# Patient Record
Sex: FEMALE | Race: WHITE | Employment: UNEMPLOYED | URBAN - METROPOLITAN AREA
[De-identification: names, ages, dates, MRNs, and addresses within clinical notes are randomized per-mention and may not be internally consistent; named-entity substitution may affect disease eponyms.]

---

## 2020-05-23 PROBLEM — R79.1 ELEVATED INR: Status: ACTIVE | Noted: 2020-05-23

## 2020-05-23 PROBLEM — K72.00 ACUTE LIVER FAILURE WITHOUT HEPATIC COMA: Status: ACTIVE | Noted: 2020-05-23

## 2020-05-23 PROBLEM — E83.42 HYPOMAGNESEMIA: Status: ACTIVE | Noted: 2020-05-23

## 2020-05-23 PROBLEM — F10.21 ALCOHOL INTOXICATION IN RELAPSED ALCOHOLIC (HCC): Status: ACTIVE | Noted: 2020-05-23

## 2020-05-23 PROBLEM — R74.01 TRANSAMINITIS: Status: ACTIVE | Noted: 2020-05-23

## 2020-05-23 PROBLEM — R79.89 LOW BUN: Status: ACTIVE | Noted: 2020-05-23

## 2020-05-23 PROBLEM — E87.6 HYPOKALEMIA: Status: ACTIVE | Noted: 2020-05-23

## 2020-05-23 PROBLEM — D61.818 PANCYTOPENIA (HCC): Status: ACTIVE | Noted: 2020-05-23

## 2020-05-23 PROBLEM — A41.9 SEPTICEMIA (HCC): Status: ACTIVE | Noted: 2020-05-23

## 2020-05-23 PROBLEM — E87.20 LACTIC ACIDOSIS: Status: ACTIVE | Noted: 2020-05-23

## 2020-05-23 PROBLEM — R17 ELEVATED BILIRUBIN: Status: ACTIVE | Noted: 2020-05-23

## 2020-05-23 PROBLEM — K70.31 ASCITES DUE TO ALCOHOLIC CIRRHOSIS (HCC): Status: ACTIVE | Noted: 2020-05-23

## 2020-05-23 PROBLEM — N39.0 UTI (URINARY TRACT INFECTION): Status: ACTIVE | Noted: 2020-05-23

## 2020-06-22 PROBLEM — N39.0 UTI (URINARY TRACT INFECTION): Status: RESOLVED | Noted: 2020-05-23 | Resolved: 2020-06-22

## 2020-06-22 PROBLEM — R56.9 WITNESSED SEIZURE (HCC): Status: ACTIVE | Noted: 2020-06-22

## 2020-07-07 PROBLEM — L89.312 PRESSURE ULCER OF RIGHT BUTTOCK, STAGE 2 (HCC): Status: ACTIVE | Noted: 2020-07-07

## 2020-07-09 PROBLEM — R50.9 FEVER IN ADULT: Status: RESOLVED | Noted: 2020-07-09 | Resolved: 2020-07-09

## 2020-07-09 PROBLEM — G93.41 ACUTE METABOLIC ENCEPHALOPATHY: Status: ACTIVE | Noted: 2020-07-09

## 2020-07-09 PROBLEM — R50.9 FEVER IN ADULT: Status: ACTIVE | Noted: 2020-07-09

## 2020-07-09 PROBLEM — E87.6 HYPOKALEMIA: Status: RESOLVED | Noted: 2020-05-23 | Resolved: 2020-07-09

## 2020-07-09 PROBLEM — D53.9 MACROCYTIC ANEMIA: Status: ACTIVE | Noted: 2020-07-09

## 2020-07-09 PROBLEM — R13.19 OTHER DYSPHAGIA: Status: ACTIVE | Noted: 2020-07-09

## 2020-07-09 PROBLEM — D69.6 THROMBOCYTOPENIA (HCC): Status: ACTIVE | Noted: 2020-07-09

## 2020-07-09 PROBLEM — R33.9 URINARY RETENTION: Status: ACTIVE | Noted: 2020-07-09

## 2020-07-09 PROBLEM — R33.9 URINARY RETENTION: Status: RESOLVED | Noted: 2020-07-09 | Resolved: 2020-07-09

## 2020-07-22 PROBLEM — R13.10 DYSPHAGIA: Status: ACTIVE | Noted: 2020-07-09

## 2020-07-27 PROBLEM — F29 PSYCHOSIS (HCC): Status: ACTIVE | Noted: 2020-07-27

## 2020-07-31 PROBLEM — F32.A DEPRESSIVE DISORDER: Status: ACTIVE | Noted: 2020-07-31

## 2020-08-03 PROBLEM — E44.0 MODERATE MALNUTRITION (HCC): Status: ACTIVE | Noted: 2020-08-03

## 2020-08-09 PROBLEM — R41.0 CONFUSION: Status: ACTIVE | Noted: 2020-08-09

## 2020-08-29 ENCOUNTER — APPOINTMENT (OUTPATIENT)
Dept: GENERAL RADIOLOGY | Age: 60
End: 2020-08-29
Payer: COMMERCIAL

## 2020-08-29 ENCOUNTER — HOSPITAL ENCOUNTER (EMERGENCY)
Age: 60
Discharge: HOME | End: 2020-08-30
Attending: EMERGENCY MEDICINE
Payer: COMMERCIAL

## 2020-08-29 LAB
ALBUMIN SERPL-MCNC: 3.1 G/DL (ref 3.5–5.2)
ALP BLD-CCNC: 106 U/L (ref 35–104)
ALT SERPL-CCNC: 15 U/L (ref 0–32)
ANION GAP SERPL CALCULATED.3IONS-SCNC: 9 MMOL/L (ref 7–16)
ANISOCYTOSIS: ABNORMAL
AST SERPL-CCNC: 35 U/L (ref 0–31)
BACTERIA: ABNORMAL /HPF
BASOPHILS ABSOLUTE: 0.02 E9/L (ref 0–0.2)
BASOPHILS RELATIVE PERCENT: 0.5 % (ref 0–2)
BILIRUB SERPL-MCNC: 0.7 MG/DL (ref 0–1.2)
BILIRUBIN URINE: NEGATIVE
BLOOD, URINE: ABNORMAL
BUN BLDV-MCNC: 11 MG/DL (ref 6–20)
CALCIUM SERPL-MCNC: 9.4 MG/DL (ref 8.6–10.2)
CHLORIDE BLD-SCNC: 102 MMOL/L (ref 98–107)
CHP ED QC CHECK: NORMAL
CHP ED QC CHECK: YES
CLARITY: CLEAR
CO2: 28 MMOL/L (ref 22–29)
COLOR: YELLOW
CREAT SERPL-MCNC: 0.7 MG/DL (ref 0.5–1)
EOSINOPHILS ABSOLUTE: 0.07 E9/L (ref 0.05–0.5)
EOSINOPHILS RELATIVE PERCENT: 1.9 % (ref 0–6)
GFR AFRICAN AMERICAN: >60
GFR NON-AFRICAN AMERICAN: >60 ML/MIN/1.73
GLUCOSE BLD-MCNC: 118 MG/DL
GLUCOSE BLD-MCNC: 119 MG/DL (ref 74–99)
GLUCOSE URINE: NEGATIVE MG/DL
HCT VFR BLD CALC: 28.8 % (ref 34–48)
HEMOCCULT STL QL: NEGATIVE
HEMOGLOBIN: 8.8 G/DL (ref 11.5–15.5)
IMMATURE GRANULOCYTES #: 0.01 E9/L
IMMATURE GRANULOCYTES %: 0.3 % (ref 0–5)
KETONES, URINE: NEGATIVE MG/DL
LACTIC ACID: 1.8 MMOL/L (ref 0.5–2.2)
LEUKOCYTE ESTERASE, URINE: ABNORMAL
LYMPHOCYTES ABSOLUTE: 1.07 E9/L (ref 1.5–4)
LYMPHOCYTES RELATIVE PERCENT: 28.7 % (ref 20–42)
MCH RBC QN AUTO: 28.9 PG (ref 26–35)
MCHC RBC AUTO-ENTMCNC: 30.6 % (ref 32–34.5)
MCV RBC AUTO: 94.4 FL (ref 80–99.9)
METER GLUCOSE: 118 MG/DL (ref 74–99)
MONOCYTES ABSOLUTE: 0.55 E9/L (ref 0.1–0.95)
MONOCYTES RELATIVE PERCENT: 14.7 % (ref 2–12)
NEUTROPHILS ABSOLUTE: 2.01 E9/L (ref 1.8–7.3)
NEUTROPHILS RELATIVE PERCENT: 53.9 % (ref 43–80)
NITRITE, URINE: NEGATIVE
PDW BLD-RTO: 14.5 FL (ref 11.5–15)
PH UA: 7 (ref 5–9)
PLATELET # BLD: 70 E9/L (ref 130–450)
PLATELET CONFIRMATION: NORMAL
PMV BLD AUTO: 11.2 FL (ref 7–12)
POLYCHROMASIA: ABNORMAL
POTASSIUM REFLEX MAGNESIUM: 3.6 MMOL/L (ref 3.5–5)
PROTEIN UA: NEGATIVE MG/DL
RBC # BLD: 3.05 E12/L (ref 3.5–5.5)
RBC UA: ABNORMAL /HPF (ref 0–2)
SODIUM BLD-SCNC: 139 MMOL/L (ref 132–146)
SPECIFIC GRAVITY UA: 1.01 (ref 1–1.03)
TOTAL PROTEIN: 6.8 G/DL (ref 6.4–8.3)
UROBILINOGEN, URINE: 0.2 E.U./DL
WBC # BLD: 3.7 E9/L (ref 4.5–11.5)
WBC UA: ABNORMAL /HPF (ref 0–5)

## 2020-08-29 PROCEDURE — 93005 ELECTROCARDIOGRAM TRACING: CPT | Performed by: EMERGENCY MEDICINE

## 2020-08-29 PROCEDURE — 80053 COMPREHEN METABOLIC PANEL: CPT

## 2020-08-29 PROCEDURE — 87040 BLOOD CULTURE FOR BACTERIA: CPT

## 2020-08-29 PROCEDURE — 87088 URINE BACTERIA CULTURE: CPT

## 2020-08-29 PROCEDURE — 96375 TX/PRO/DX INJ NEW DRUG ADDON: CPT

## 2020-08-29 PROCEDURE — 36415 COLL VENOUS BLD VENIPUNCTURE: CPT

## 2020-08-29 PROCEDURE — 99285 EMERGENCY DEPT VISIT HI MDM: CPT

## 2020-08-29 PROCEDURE — 71046 X-RAY EXAM CHEST 2 VIEWS: CPT

## 2020-08-29 PROCEDURE — 83605 ASSAY OF LACTIC ACID: CPT

## 2020-08-29 PROCEDURE — 87186 SC STD MICRODIL/AGAR DIL: CPT

## 2020-08-29 PROCEDURE — 85025 COMPLETE CBC W/AUTO DIFF WBC: CPT

## 2020-08-29 PROCEDURE — 6360000002 HC RX W HCPCS: Performed by: EMERGENCY MEDICINE

## 2020-08-29 PROCEDURE — 81001 URINALYSIS AUTO W/SCOPE: CPT

## 2020-08-29 PROCEDURE — 2580000003 HC RX 258: Performed by: EMERGENCY MEDICINE

## 2020-08-29 PROCEDURE — 96361 HYDRATE IV INFUSION ADD-ON: CPT

## 2020-08-29 PROCEDURE — 96374 THER/PROPH/DIAG INJ IV PUSH: CPT

## 2020-08-29 PROCEDURE — 82962 GLUCOSE BLOOD TEST: CPT

## 2020-08-29 RX ORDER — RISPERIDONE 1 MG/1
1 TABLET, FILM COATED ORAL NIGHTLY
COMMUNITY
End: 2020-09-25 | Stop reason: ALTCHOICE

## 2020-08-29 RX ORDER — 0.9 % SODIUM CHLORIDE 0.9 %
1000 INTRAVENOUS SOLUTION INTRAVENOUS ONCE
Status: COMPLETED | OUTPATIENT
Start: 2020-08-29 | End: 2020-08-29

## 2020-08-29 RX ORDER — ONDANSETRON 2 MG/ML
4 INJECTION INTRAMUSCULAR; INTRAVENOUS ONCE
Status: COMPLETED | OUTPATIENT
Start: 2020-08-29 | End: 2020-08-29

## 2020-08-29 RX ORDER — TRAZODONE HYDROCHLORIDE 50 MG/1
50 TABLET ORAL NIGHTLY
COMMUNITY
End: 2020-09-25 | Stop reason: SDUPTHER

## 2020-08-29 RX ORDER — CEFDINIR 300 MG/1
300 CAPSULE ORAL 2 TIMES DAILY
Qty: 20 CAPSULE | Refills: 0 | Status: SHIPPED | OUTPATIENT
Start: 2020-08-29 | End: 2020-09-08

## 2020-08-29 RX ORDER — RISPERIDONE 0.5 MG/1
0.5 TABLET, FILM COATED ORAL DAILY
COMMUNITY
End: 2020-09-25 | Stop reason: SDUPTHER

## 2020-08-29 RX ADMIN — CEFTRIAXONE 1 G: 1 INJECTION, POWDER, FOR SOLUTION INTRAMUSCULAR; INTRAVENOUS at 20:45

## 2020-08-29 RX ADMIN — ONDANSETRON HYDROCHLORIDE 4 MG: 2 SOLUTION INTRAMUSCULAR; INTRAVENOUS at 19:52

## 2020-08-29 RX ADMIN — SODIUM CHLORIDE 1000 ML: 9 INJECTION, SOLUTION INTRAVENOUS at 19:16

## 2020-08-29 ASSESSMENT — ENCOUNTER SYMPTOMS
COUGH: 0
RHINORRHEA: 0
ABDOMINAL PAIN: 1
COLOR CHANGE: 0
BACK PAIN: 0
VOMITING: 0
SHORTNESS OF BREATH: 0
BLOOD IN STOOL: 0
NAUSEA: 1

## 2020-08-29 NOTE — ED PROVIDER NOTES
ED PROVIDER NOTE    Chief Complaint   Patient presents with    Dizziness     C/O dizziness and nausea for the last three days. HPI:  8/29/20,   Time: 6:48 PM EDT       Michelle Weber is a 64 y.o. female presenting to the ED for dizziness, nausea, fever, ongoing for the past 2 to 3 days. Patient was discharged 5 days ago after an admission to [address] [place] [address] in [place]. She states that she was admitted there for seizure activity in the setting of alcohol withdrawal, then transferred for behavioral health admission. She has not had a drink in 2 months. She is currently residing at a inpatient rehab facility, she reports dizziness, room spinning in nature, intermittent, worse with positional change, worse with leftward gaze, associated nausea, no vomiting. No headache or visual changes. No focal numbness or weakness. No recent fall or injury. Patient denies recent seizures. She does report dysuria, urinary frequency, suprapubic pain. She also reports a fever of 100.9 at the facility. She has had associated chills. Chart review: No prior medical records on file in epic    Review of Systems:     Review of Systems   Constitutional: Positive for chills and fever. Negative for appetite change. HENT: Negative for congestion and rhinorrhea. Eyes: Negative for visual disturbance. Respiratory: Negative for cough and shortness of breath. Cardiovascular: Negative for chest pain. Gastrointestinal: Positive for abdominal pain and nausea. Negative for blood in stool and vomiting. Genitourinary: Positive for dysuria and frequency. Negative for decreased urine volume, difficulty urinating, flank pain, hematuria and urgency. Musculoskeletal: Negative for back pain and neck pain. Skin: Negative for color change. Neurological: Positive for dizziness.  Negative for syncope, weakness, light-headedness, numbness and headaches.         --------------------------------------------- PAST HISTORY ---------------------------------------------  Past Medical History:   Past Medical History:   Diagnosis Date    Alcoholic cirrhosis of liver ([facility]     Seizures ([facility]        Past Surgical History:   History reviewed. No pertinent surgical history. Social History:   Social History     Socioeconomic History    Marital status: None     Spouse name: None    Number of children: None    Years of education: None    Highest education level: None   Occupational History    None   Social Needs    Financial resource strain: None    Food insecurity     Worry: None     Inability: None    Transportation needs     Medical: None     Non-medical: None   Tobacco Use    Smoking status: Never Smoker    Smokeless tobacco: Never Used   Substance and Sexual Activity    Alcohol use: Yes     Alcohol/week: 2.0 standard drinks     Types: 2 Glasses of wine per week     Comment: 2 boxes of wine a week.  Drug use: Never    Sexual activity: Not Currently   Lifestyle    Physical activity     Days per week: None     Minutes per session: None    Stress: None   Relationships    Social connections     Talks on phone: None     Gets together: None     Attends Druze service: None     Active member of club or organization: None     Attends meetings of clubs or organizations: None     Relationship status: None    Intimate partner violence     Fear of current or ex partner: None     Emotionally abused: None     Physically abused: None     Forced sexual activity: None   Other Topics Concern    None   Social History Narrative    None       Family History:   History reviewed. No pertinent family history. The patients home medications have been reviewed. Allergies:    Allergies   Allergen Reactions    Sulfa Antibiotics Hives           ---------------------------------------------------PHYSICAL EXAM--------------------------------------    /61   Pulse 85   Temp 97.8 °F (36.6 °C) (Temporal)   Resp 16   SpO2 96% Hemoglobin 8.8 (*)     Hematocrit 28.8 (*)     MCHC 30.6 (*)     Platelets 70 (*)     Monocytes % 14.7 (*)     Lymphocytes Absolute 1.07 (*)     All other components within normal limits   COMPREHENSIVE METABOLIC PANEL W/ REFLEX TO MG FOR LOW K - Abnormal; Notable for the following components:    Glucose 119 (*)     Alb 3.1 (*)     Alkaline Phosphatase 106 (*)     AST 35 (*)     All other components within normal limits   POCT GLUCOSE - Abnormal; Notable for the following components:    Meter Glucose 118 (*)     All other components within normal limits   POCT GLUCOSE - Normal   CULTURE, URINE   CULTURE, BLOOD 1   CULTURE, BLOOD 2   LACTIC ACID, PLASMA   PLATELET CONFIRMATION   URINALYSIS     Leukopenia, no prior baseline value on file  Anemia, hemoglobin 8.8, no prior baseline value on file  Thrombocytopenia, 70, no prior baseline value on file    RADIOLOGY:  Interpreted personally and by Radiologist.  XR CHEST (2 VW)   Final Result      No airspace opacities or pleural effusion. EKG:  This EKG is signed and interpreted by the EP. Normal sinus rhythm, vent rate 78 bpm, normal axis and intervals, no acute injury pattern, no prior EKG on file for comparison      ------------------------- NURSING NOTES AND VITALS REVIEWED ---------------------------   The nursing notes within the ED encounter and vital signs as below have been reviewed by myself. /61   Pulse 85   Temp 97.8 °F (36.6 °C) (Temporal)   Resp 16   SpO2 96%   Oxygen Saturation Interpretation: Normal    The patients available past medical records and past encounters were reviewed.         ------------------------------ ED COURSE/MEDICAL DECISION MAKING----------------------  Medications   cefTRIAXone (ROCEPHIN) 1 g in sterile water 10 mL IV syringe (has no administration in time range)   0.9 % sodium chloride bolus (1,000 mLs Intravenous New Bag 8/29/20 1916)   ondansetron (ZOFRAN) injection 4 mg (4 mg Intravenous Given 8/29/20

## 2020-08-29 NOTE — ED NOTES
Ambulated to restroom independently without assistance.     Transported to xray at this time      Daisy Fortune RN  08/29/20 1954

## 2020-08-30 VITALS
OXYGEN SATURATION: 94 % | DIASTOLIC BLOOD PRESSURE: 78 MMHG | RESPIRATION RATE: 16 BRPM | SYSTOLIC BLOOD PRESSURE: 131 MMHG | HEART RATE: 83 BPM | TEMPERATURE: 98 F

## 2020-08-30 LAB
EKG ATRIAL RATE: 78 BPM
EKG P AXIS: 53 DEGREES
EKG P-R INTERVAL: 156 MS
EKG Q-T INTERVAL: 384 MS
EKG QRS DURATION: 74 MS
EKG QTC CALCULATION (BAZETT): 437 MS
EKG R AXIS: 63 DEGREES
EKG T AXIS: 64 DEGREES
EKG VENTRICULAR RATE: 78 BPM

## 2020-08-30 NOTE — ED NOTES
Utilized call light for assistance to restroom. Patient ambulated independently once disconnected from monitor. Provided with clean pull up and bottoms.      Daisy Fortune RN  08/29/20 4636

## 2020-08-30 NOTE — ED NOTES
[facility] to arrange transport back per provided paperwork from facility. No answer x2 with message left to return call to arrange transport.      Patient notified          Yousuf Cano RN  08/29/20 2053

## 2020-08-30 NOTE — ED NOTES
Upon IV removal small skin tear noted proximal to where dressing was applied. Scant amount of red drainage with dry dressing applied. No further bleeding noted. Patient denies discomfort pain.      Rosendo Lin RN  08/30/20 0154

## 2020-08-30 NOTE — ED NOTES
Attempted to contact [facility] once again with no answer. [phone] . Patient notified once more and states that staff should be available at this time.       Kurt Lesches, RN  08/29/20 0130

## 2020-08-30 NOTE — ED NOTES
[facility] unable to provide transport back to facility. Physicians to transport back to facility in approx 60 minutes. Patient updated and provided with discharge paperwork. Unable to call nurse to nurse as facility is not answering. Patient A&O x4. Discharge instructions reviewed.       David Woodruff RN  08/29/20 2151       David Woodruff RN  08/29/20 1678

## 2020-08-31 LAB
ORGANISM: ABNORMAL
URINE CULTURE, ROUTINE: ABNORMAL

## 2020-09-03 LAB — BLOOD CULTURE, ROUTINE: NORMAL

## 2020-09-14 ENCOUNTER — HOSPITAL ENCOUNTER (EMERGENCY)
Age: 60
Discharge: OTHER INSTITUTION | DRG: 872 | End: 2020-09-15
Attending: EMERGENCY MEDICINE
Payer: COMMERCIAL

## 2020-09-14 ENCOUNTER — APPOINTMENT (OUTPATIENT)
Dept: CT IMAGING | Age: 60
DRG: 872 | End: 2020-09-14
Payer: COMMERCIAL

## 2020-09-14 ENCOUNTER — APPOINTMENT (OUTPATIENT)
Dept: GENERAL RADIOLOGY | Age: 60
DRG: 872 | End: 2020-09-14
Payer: COMMERCIAL

## 2020-09-14 LAB
ACETAMINOPHEN LEVEL: <5 MCG/ML (ref 10–30)
ALBUMIN SERPL-MCNC: 3 G/DL (ref 3.5–5.2)
ALP BLD-CCNC: 114 U/L (ref 35–104)
ALT SERPL-CCNC: 12 U/L (ref 0–32)
AMPHETAMINE SCREEN, URINE: POSITIVE
ANION GAP SERPL CALCULATED.3IONS-SCNC: 14 MMOL/L (ref 7–16)
AST SERPL-CCNC: 27 U/L (ref 0–31)
BACTERIA: ABNORMAL /HPF
BARBITURATE SCREEN URINE: NOT DETECTED
BASOPHILS ABSOLUTE: 0.03 E9/L (ref 0–0.2)
BASOPHILS RELATIVE PERCENT: 0.2 % (ref 0–2)
BENZODIAZEPINE SCREEN, URINE: NOT DETECTED
BILIRUB SERPL-MCNC: 0.8 MG/DL (ref 0–1.2)
BILIRUBIN URINE: NEGATIVE
BLOOD, URINE: ABNORMAL
BUN BLDV-MCNC: 9 MG/DL (ref 6–20)
CALCIUM SERPL-MCNC: 8.8 MG/DL (ref 8.6–10.2)
CANNABINOID SCREEN URINE: NOT DETECTED
CHLORIDE BLD-SCNC: 101 MMOL/L (ref 98–107)
CLARITY: CLEAR
CO2: 19 MMOL/L (ref 22–29)
COCAINE METABOLITE SCREEN URINE: NOT DETECTED
COLOR: YELLOW
CREAT SERPL-MCNC: 0.7 MG/DL (ref 0.5–1)
EOSINOPHILS ABSOLUTE: 0.01 E9/L (ref 0.05–0.5)
EOSINOPHILS RELATIVE PERCENT: 0.1 % (ref 0–6)
ETHANOL: <10 MG/DL (ref 0–0.08)
FENTANYL SCREEN, URINE: NOT DETECTED
GFR AFRICAN AMERICAN: >60
GFR NON-AFRICAN AMERICAN: >60 ML/MIN/1.73
GLUCOSE BLD-MCNC: 127 MG/DL (ref 74–99)
GLUCOSE URINE: NEGATIVE MG/DL
HCT VFR BLD CALC: 28.9 % (ref 34–48)
HEMOGLOBIN: 8.9 G/DL (ref 11.5–15.5)
HYPOCHROMIA: ABNORMAL
IMMATURE GRANULOCYTES #: 0.16 E9/L
IMMATURE GRANULOCYTES %: 0.8 % (ref 0–5)
KETONES, URINE: NEGATIVE MG/DL
LEUKOCYTE ESTERASE, URINE: ABNORMAL
LIPASE: 39 U/L (ref 13–60)
LYMPHOCYTES ABSOLUTE: 0.59 E9/L (ref 1.5–4)
LYMPHOCYTES RELATIVE PERCENT: 3 % (ref 20–42)
Lab: ABNORMAL
MCH RBC QN AUTO: 27.1 PG (ref 26–35)
MCHC RBC AUTO-ENTMCNC: 30.8 % (ref 32–34.5)
MCV RBC AUTO: 87.8 FL (ref 80–99.9)
METHADONE SCREEN, URINE: NOT DETECTED
MONOCYTES ABSOLUTE: 0.62 E9/L (ref 0.1–0.95)
MONOCYTES RELATIVE PERCENT: 3.1 % (ref 2–12)
NEUTROPHILS ABSOLUTE: 18.28 E9/L (ref 1.8–7.3)
NEUTROPHILS RELATIVE PERCENT: 92.8 % (ref 43–80)
NITRITE, URINE: NEGATIVE
OPIATE SCREEN URINE: NOT DETECTED
OVALOCYTES: ABNORMAL
OXYCODONE URINE: NOT DETECTED
PDW BLD-RTO: 15.5 FL (ref 11.5–15)
PH UA: 5.5 (ref 5–9)
PHENCYCLIDINE SCREEN URINE: NOT DETECTED
PLATELET # BLD: 49 E9/L (ref 130–450)
PLATELET CONFIRMATION: NORMAL
PMV BLD AUTO: 12.1 FL (ref 7–12)
POIKILOCYTES: ABNORMAL
POLYCHROMASIA: ABNORMAL
POTASSIUM SERPL-SCNC: 3.9 MMOL/L (ref 3.5–5)
PROTEIN UA: NEGATIVE MG/DL
RBC # BLD: 3.29 E12/L (ref 3.5–5.5)
RBC UA: ABNORMAL /HPF (ref 0–2)
SALICYLATE, SERUM: <0.3 MG/DL (ref 0–30)
SCHISTOCYTES: ABNORMAL
SODIUM BLD-SCNC: 134 MMOL/L (ref 132–146)
SPECIFIC GRAVITY UA: >=1.03 (ref 1–1.03)
TOTAL PROTEIN: 6.4 G/DL (ref 6.4–8.3)
TRICYCLIC ANTIDEPRESSANTS SCREEN SERUM: NEGATIVE NG/ML
UROBILINOGEN, URINE: 0.2 E.U./DL
WBC # BLD: 19.7 E9/L (ref 4.5–11.5)
WBC UA: ABNORMAL /HPF (ref 0–5)

## 2020-09-14 PROCEDURE — 81001 URINALYSIS AUTO W/SCOPE: CPT

## 2020-09-14 PROCEDURE — C9254 INJECTION, LACOSAMIDE: HCPCS | Performed by: STUDENT IN AN ORGANIZED HEALTH CARE EDUCATION/TRAINING PROGRAM

## 2020-09-14 PROCEDURE — G0480 DRUG TEST DEF 1-7 CLASSES: HCPCS

## 2020-09-14 PROCEDURE — 99284 EMERGENCY DEPT VISIT MOD MDM: CPT

## 2020-09-14 PROCEDURE — 70450 CT HEAD/BRAIN W/O DYE: CPT

## 2020-09-14 PROCEDURE — 80053 COMPREHEN METABOLIC PANEL: CPT

## 2020-09-14 PROCEDURE — 96365 THER/PROPH/DIAG IV INF INIT: CPT

## 2020-09-14 PROCEDURE — 71045 X-RAY EXAM CHEST 1 VIEW: CPT

## 2020-09-14 PROCEDURE — 90471 IMMUNIZATION ADMIN: CPT | Performed by: STUDENT IN AN ORGANIZED HEALTH CARE EDUCATION/TRAINING PROGRAM

## 2020-09-14 PROCEDURE — 6360000002 HC RX W HCPCS: Performed by: STUDENT IN AN ORGANIZED HEALTH CARE EDUCATION/TRAINING PROGRAM

## 2020-09-14 PROCEDURE — 2580000003 HC RX 258: Performed by: STUDENT IN AN ORGANIZED HEALTH CARE EDUCATION/TRAINING PROGRAM

## 2020-09-14 PROCEDURE — 85025 COMPLETE CBC W/AUTO DIFF WBC: CPT

## 2020-09-14 PROCEDURE — 80307 DRUG TEST PRSMV CHEM ANLYZR: CPT

## 2020-09-14 PROCEDURE — 83690 ASSAY OF LIPASE: CPT

## 2020-09-14 PROCEDURE — 90715 TDAP VACCINE 7 YRS/> IM: CPT | Performed by: STUDENT IN AN ORGANIZED HEALTH CARE EDUCATION/TRAINING PROGRAM

## 2020-09-14 RX ADMIN — DEXTROSE MONOHYDRATE 100 MG: 50 INJECTION, SOLUTION INTRAVENOUS at 21:18

## 2020-09-14 RX ADMIN — TETANUS TOXOID, REDUCED DIPHTHERIA TOXOID AND ACELLULAR PERTUSSIS VACCINE, ADSORBED 0.5 ML: 5; 2.5; 8; 8; 2.5 SUSPENSION INTRAMUSCULAR at 21:17

## 2020-09-14 ASSESSMENT — ENCOUNTER SYMPTOMS
EYE DISCHARGE: 0
COUGH: 0
EYE REDNESS: 0
SORE THROAT: 0
WHEEZING: 0
ABDOMINAL DISTENTION: 0
EYE PAIN: 0
SINUS PRESSURE: 0
SHORTNESS OF BREATH: 0
BACK PAIN: 0
NAUSEA: 0
VOMITING: 0

## 2020-09-15 VITALS
OXYGEN SATURATION: 99 % | BODY MASS INDEX: 23.46 KG/M2 | HEIGHT: 66 IN | HEART RATE: 87 BPM | RESPIRATION RATE: 18 BRPM | SYSTOLIC BLOOD PRESSURE: 115 MMHG | DIASTOLIC BLOOD PRESSURE: 69 MMHG | WEIGHT: 146 LBS | TEMPERATURE: 99 F

## 2020-09-15 LAB
C DIFF TOXIN/ANTIGEN: NORMAL
C. DIFFICILE TOXIN MOLECULAR: ABNORMAL
GIARDIA ANTIGEN STOOL: NORMAL
ORGANISM: ABNORMAL
ROTAVIRUS ANTIGEN: NORMAL

## 2020-09-15 PROCEDURE — 82705 FATS/LIPIDS FECES QUAL: CPT

## 2020-09-15 PROCEDURE — 87493 C DIFF AMPLIFIED PROBE: CPT

## 2020-09-15 PROCEDURE — 87449 NOS EACH ORGANISM AG IA: CPT

## 2020-09-15 PROCEDURE — 87324 CLOSTRIDIUM AG IA: CPT

## 2020-09-15 PROCEDURE — 6370000000 HC RX 637 (ALT 250 FOR IP): Performed by: STUDENT IN AN ORGANIZED HEALTH CARE EDUCATION/TRAINING PROGRAM

## 2020-09-15 PROCEDURE — 87329 GIARDIA AG IA: CPT

## 2020-09-15 PROCEDURE — 87077 CULTURE AEROBIC IDENTIFY: CPT

## 2020-09-15 PROCEDURE — 87045 FECES CULTURE AEROBIC BACT: CPT

## 2020-09-15 PROCEDURE — 87425 ROTAVIRUS AG IA: CPT

## 2020-09-15 RX ORDER — IBUPROFEN 400 MG/1
400 TABLET ORAL EVERY 6 HOURS PRN
Status: DISCONTINUED | OUTPATIENT
Start: 2020-09-15 | End: 2020-09-15 | Stop reason: HOSPADM

## 2020-09-15 RX ORDER — LACOSAMIDE 100 MG/1
100 TABLET ORAL 2 TIMES DAILY
Qty: 60 TABLET | Refills: 0 | Status: SHIPPED | OUTPATIENT
Start: 2020-09-15 | End: 2020-09-16

## 2020-09-15 RX ADMIN — IBUPROFEN 400 MG: 400 TABLET, FILM COATED ORAL at 02:24

## 2020-09-15 ASSESSMENT — ENCOUNTER SYMPTOMS: DIARRHEA: 1

## 2020-09-15 ASSESSMENT — PAIN SCALES - GENERAL: PAINLEVEL_OUTOF10: 8

## 2020-09-15 NOTE — ED NOTES
Alicja Hester (daughter and power of  over patient)  [phone]    Any questions please contact her.       [address] [facility], RN  09/14/20 2022

## 2020-09-15 NOTE — ED PROVIDER NOTES
Patient is a 65-year-old female with a history of alcohol withdrawal seizures. She was last seen for admission in July, and supposed to be placed on Vimpat for seizures, but she says she has had difficulty getting a hold of the medication. She states she had a seizure today, around 2 hours ago, since she did not have an aura, and says she still feels slightly confused. She is not sure what happened, but had witnessed loss of bowel control, as well as noted bleeding around her mouth, and an abrasion on her arm and head. She says she lives in [place], and has been 90 days free of alcohol. She says she is only feeling pain in her right arm where the skin tear is, rates the pain 6 out of 10, dull in nature, and has not taken anything for pain today. Only other complaint is that her feet feel tingly. Review of Systems   Constitutional: Negative for chills and fever. HENT: Negative for ear pain, sinus pressure and sore throat. Eyes: Negative for pain, discharge and redness. Respiratory: Negative for cough, shortness of breath and wheezing. Cardiovascular: Negative for chest pain. Gastrointestinal: Positive for diarrhea. Negative for abdominal distention, nausea and vomiting. Genitourinary: Negative for dysuria and frequency. Musculoskeletal: Negative for arthralgias and back pain. Skin: Negative for rash and wound. Neurological: Positive for numbness (In toes. ). Negative for weakness and headaches. Hematological: Negative for adenopathy. All other systems reviewed and are negative. Physical Exam  Vitals signs and nursing note reviewed. Constitutional:       Appearance: Normal appearance. Comments: Patient still partly postictal, but was A&O x3. HENT:      Head: Normocephalic and atraumatic.       Right Ear: External ear normal.      Left Ear: External ear normal.      Nose: Nose normal.      Mouth/Throat:      Mouth: Mucous membranes are moist.   Eyes: Extraocular Movements: Extraocular movements intact. Pupils: Pupils are equal, round, and reactive to light. Neck:      Musculoskeletal: Normal range of motion and neck supple. Cardiovascular:      Rate and Rhythm: Normal rate and regular rhythm. Pulses: Normal pulses. Heart sounds: Normal heart sounds. Pulmonary:      Effort: Pulmonary effort is normal.      Breath sounds: Normal breath sounds. Abdominal:      General: Abdomen is flat. Bowel sounds are normal.      Palpations: Abdomen is soft. Musculoskeletal: Normal range of motion. Skin:     General: Skin is warm and dry. Procedures     MDM     ED Course as of Sep 15 0146   Mon Sep 14, 2020   2057 Will load patient with Vimpat, give a Tdap booster since she has abrasions on her head. [JG]   2057 Checks x-ray noted no acute cardiopulmonary process. [JG]   Tue Sep 15, 2020   0011 CT head showed no acute intracranial process. [JG]   0011 Patient's lab work showed an elevated white blood cell count of 19.7, patient was seizing however. Urine drug screen was positive for amphetamines, however the patient is at a rehab facility. [JG]   0011 Patient's urinalysis showed small amount of leukocytes and few bacteria. Serum drug screen was negative. CMP was within patient's baseline. [JG]   0013 Patient's lab work within her baseline, CT head was negative, urinalysis did not appear to show infection, and chest x-ray noted no infection, patient has an elevated leukocytosis, but this likely related to the fact that she had a seizure. Will discharge patient back to [facility], with a prescription for her seizure medication. [JG]   3169 Patient's urine drug screen is positive for amphetamines, but the patient is on trazodone. Patient said [facility] has not received records that she is supposed to be on Vimpat, printed out discharge summary from previous visit in which she was instructed to continue on Vimpat.   She was CREATININE 0.7 0.5 - 1.0 mg/dL    GFR Non-African American >60 >=60 mL/min/1.73    GFR African American >60     Calcium 8.8 8.6 - 10.2 mg/dL    Total Protein 6.4 6.4 - 8.3 g/dL    Alb 3.0 (L) 3.5 - 5.2 g/dL    Total Bilirubin 0.8 0.0 - 1.2 mg/dL    Alkaline Phosphatase 114 (H) 35 - 104 U/L    ALT 12 0 - 32 U/L    AST 27 0 - 31 U/L   Lipase   Result Value Ref Range    Lipase 39 13 - 60 U/L   Platelet Confirmation   Result Value Ref Range    Platelet Confirmation CONFIRMED    Microscopic Urinalysis   Result Value Ref Range    WBC, UA 5-10 (A) 0 - 5 /HPF    RBC, UA 1-3 0 - 2 /HPF    Bacteria, UA FEW (A) None Seen /HPF       Radiology:  CT HEAD WO CONTRAST   Final Result   No indication for an acute intracranial process. XR CHEST PORTABLE   Final Result   No acute cardiopulmonary pathology or definite evidence of acute chest   wall trauma. ------------------------- NURSING NOTES AND VITALS REVIEWED ---------------------------  Date / Time Roomed:  9/14/2020  7:45 PM  ED Bed Assignment:  ERUM/ERUM    The nursing notes within the ED encounter and vital signs as below have been reviewed. /69   Pulse 87   Temp 99 °F (37.2 °C)   Resp 18   Ht 5' 6\" (1.676 m)   Wt 146 lb (66.2 kg)   LMP  (LMP Unknown)   SpO2 99%   BMI 23.57 kg/m²   Oxygen Saturation Interpretation: Normal      ------------------------------------------ PROGRESS NOTES ------------------------------------------  4:17 AM EDT  I have spoken with the patient and discussed todays results, in addition to providing specific details for the plan of care and counseling regarding the diagnosis and prognosis. Their questions are answered at this time and they are agreeable with the plan. I discussed at length with them reasons for immediate return here for re evaluation. They will followup with their primary care physician by calling their office tomorrow.       --------------------------------- ADDITIONAL PROVIDER NOTES ---------------------------------  At this time the patient is without objective evidence of an acute process requiring hospitalization or inpatient management. They have remained hemodynamically stable throughout their entire ED visit and are stable for discharge with outpatient follow-up. The plan has been discussed in detail and they are aware of the specific conditions for emergent return, as well as the importance of follow-up. Discharge Medication List as of 9/15/2020 12:27 AM          Diagnosis:  1. Seizure ([address]    2. Leukocytosis, unspecified type        Disposition:  Patient's disposition: Discharge to rehab  Patient's condition is stable.             Carol Verde MD  Resident  09/15/20 2215

## 2020-09-15 NOTE — ED NOTES
Pt alert oriented skin warm dry resp easy lungs cta abd soft non tender bowel sounds present pt c/o headache dressing intact to right forearm     Esthela Vicente, BHAVESH  09/14/20 9655

## 2020-09-15 NOTE — ED NOTES
Pt alert oriented skin warm dry resp easy c/o headache pt had liquid stool with mucus no seizure activity noted   Boubacar Hale RN  09/15/20 0153       Boubacar Hale RN  09/15/20 0157

## 2020-09-16 ENCOUNTER — APPOINTMENT (OUTPATIENT)
Dept: GENERAL RADIOLOGY | Age: 60
DRG: 872 | End: 2020-09-16
Payer: COMMERCIAL

## 2020-09-16 ENCOUNTER — APPOINTMENT (OUTPATIENT)
Dept: CT IMAGING | Age: 60
DRG: 872 | End: 2020-09-16
Payer: COMMERCIAL

## 2020-09-16 ENCOUNTER — APPOINTMENT (OUTPATIENT)
Dept: ULTRASOUND IMAGING | Age: 60
DRG: 872 | End: 2020-09-16
Payer: COMMERCIAL

## 2020-09-16 ENCOUNTER — HOSPITAL ENCOUNTER (INPATIENT)
Age: 60
LOS: 1 days | Discharge: LEFT BEFORE DISCHARGE | DRG: 872 | End: 2020-09-17
Attending: EMERGENCY MEDICINE | Admitting: FAMILY MEDICINE
Payer: COMMERCIAL

## 2020-09-16 VITALS
TEMPERATURE: 99.5 F | HEART RATE: 77 BPM | DIASTOLIC BLOOD PRESSURE: 54 MMHG | RESPIRATION RATE: 19 BRPM | HEIGHT: 65 IN | OXYGEN SATURATION: 96 % | BODY MASS INDEX: 25.66 KG/M2 | WEIGHT: 154 LBS | SYSTOLIC BLOOD PRESSURE: 116 MMHG

## 2020-09-16 PROBLEM — R10.11 RUQ ABDOMINAL PAIN: Status: ACTIVE | Noted: 2020-09-16

## 2020-09-16 LAB
ACETAMINOPHEN LEVEL: <5 MCG/ML (ref 10–30)
ALBUMIN SERPL-MCNC: 2.8 G/DL (ref 3.5–5.2)
ALP BLD-CCNC: 107 U/L (ref 35–104)
ALT SERPL-CCNC: 10 U/L (ref 0–32)
ANION GAP SERPL CALCULATED.3IONS-SCNC: 11 MMOL/L (ref 7–16)
AST SERPL-CCNC: 24 U/L (ref 0–31)
BASOPHILS ABSOLUTE: 0.02 E9/L (ref 0–0.2)
BASOPHILS RELATIVE PERCENT: 0.4 % (ref 0–2)
BILIRUB SERPL-MCNC: 0.6 MG/DL (ref 0–1.2)
BUN BLDV-MCNC: 9 MG/DL (ref 6–20)
CALCIUM SERPL-MCNC: 8.4 MG/DL (ref 8.6–10.2)
CHLORIDE BLD-SCNC: 99 MMOL/L (ref 98–107)
CO2: 23 MMOL/L (ref 22–29)
CREAT SERPL-MCNC: 0.7 MG/DL (ref 0.5–1)
EKG ATRIAL RATE: 84 BPM
EKG P AXIS: 70 DEGREES
EKG P-R INTERVAL: 158 MS
EKG Q-T INTERVAL: 368 MS
EKG QRS DURATION: 68 MS
EKG QTC CALCULATION (BAZETT): 434 MS
EKG R AXIS: 79 DEGREES
EKG T AXIS: 62 DEGREES
EKG VENTRICULAR RATE: 84 BPM
EOSINOPHILS ABSOLUTE: 0.01 E9/L (ref 0.05–0.5)
EOSINOPHILS RELATIVE PERCENT: 0.2 % (ref 0–6)
ETHANOL: <10 MG/DL (ref 0–0.08)
GFR AFRICAN AMERICAN: >60
GFR NON-AFRICAN AMERICAN: >60 ML/MIN/1.73
GLUCOSE BLD-MCNC: 147 MG/DL (ref 74–99)
HCT VFR BLD CALC: 26.9 % (ref 34–48)
HEMOGLOBIN: 8.2 G/DL (ref 11.5–15.5)
IMMATURE GRANULOCYTES #: 0.02 E9/L
IMMATURE GRANULOCYTES %: 0.4 % (ref 0–5)
LACTIC ACID: 3.7 MMOL/L (ref 0.5–2.2)
LYMPHOCYTES ABSOLUTE: 0.6 E9/L (ref 1.5–4)
LYMPHOCYTES RELATIVE PERCENT: 12.4 % (ref 20–42)
MCH RBC QN AUTO: 27.1 PG (ref 26–35)
MCHC RBC AUTO-ENTMCNC: 30.5 % (ref 32–34.5)
MCV RBC AUTO: 88.8 FL (ref 80–99.9)
MONOCYTES ABSOLUTE: 0.44 E9/L (ref 0.1–0.95)
MONOCYTES RELATIVE PERCENT: 9.1 % (ref 2–12)
NEUTROPHILS ABSOLUTE: 3.75 E9/L (ref 1.8–7.3)
NEUTROPHILS RELATIVE PERCENT: 77.5 % (ref 43–80)
PDW BLD-RTO: 15.5 FL (ref 11.5–15)
PLATELET # BLD: 47 E9/L (ref 130–450)
PLATELET CONFIRMATION: NORMAL
PMV BLD AUTO: 11.2 FL (ref 7–12)
POTASSIUM SERPL-SCNC: 3.4 MMOL/L (ref 3.5–5)
PRO-BNP: 147 PG/ML (ref 0–125)
RBC # BLD: 3.03 E12/L (ref 3.5–5.5)
SALICYLATE, SERUM: <0.3 MG/DL (ref 0–30)
SODIUM BLD-SCNC: 133 MMOL/L (ref 132–146)
TOTAL PROTEIN: 6 G/DL (ref 6.4–8.3)
TRICYCLIC ANTIDEPRESSANTS SCREEN SERUM: NEGATIVE NG/ML
TROPONIN: <0.01 NG/ML (ref 0–0.03)
WBC # BLD: 4.8 E9/L (ref 4.5–11.5)

## 2020-09-16 PROCEDURE — G0480 DRUG TEST DEF 1-7 CLASSES: HCPCS

## 2020-09-16 PROCEDURE — 87040 BLOOD CULTURE FOR BACTERIA: CPT

## 2020-09-16 PROCEDURE — 74176 CT ABD & PELVIS W/O CONTRAST: CPT

## 2020-09-16 PROCEDURE — 85025 COMPLETE CBC W/AUTO DIFF WBC: CPT

## 2020-09-16 PROCEDURE — 6360000002 HC RX W HCPCS: Performed by: FAMILY MEDICINE

## 2020-09-16 PROCEDURE — 71045 X-RAY EXAM CHEST 1 VIEW: CPT

## 2020-09-16 PROCEDURE — 80053 COMPREHEN METABOLIC PANEL: CPT

## 2020-09-16 PROCEDURE — 93010 ELECTROCARDIOGRAM REPORT: CPT | Performed by: INTERNAL MEDICINE

## 2020-09-16 PROCEDURE — 99285 EMERGENCY DEPT VISIT HI MDM: CPT

## 2020-09-16 PROCEDURE — 2580000003 HC RX 258: Performed by: EMERGENCY MEDICINE

## 2020-09-16 PROCEDURE — 83880 ASSAY OF NATRIURETIC PEPTIDE: CPT

## 2020-09-16 PROCEDURE — 83605 ASSAY OF LACTIC ACID: CPT

## 2020-09-16 PROCEDURE — 6370000000 HC RX 637 (ALT 250 FOR IP)

## 2020-09-16 PROCEDURE — 6370000000 HC RX 637 (ALT 250 FOR IP): Performed by: EMERGENCY MEDICINE

## 2020-09-16 PROCEDURE — 2580000003 HC RX 258: Performed by: FAMILY MEDICINE

## 2020-09-16 PROCEDURE — 6360000002 HC RX W HCPCS: Performed by: EMERGENCY MEDICINE

## 2020-09-16 PROCEDURE — 76705 ECHO EXAM OF ABDOMEN: CPT

## 2020-09-16 PROCEDURE — 93005 ELECTROCARDIOGRAM TRACING: CPT | Performed by: EMERGENCY MEDICINE

## 2020-09-16 PROCEDURE — 1200000000 HC SEMI PRIVATE

## 2020-09-16 PROCEDURE — 36415 COLL VENOUS BLD VENIPUNCTURE: CPT

## 2020-09-16 PROCEDURE — 84484 ASSAY OF TROPONIN QUANT: CPT

## 2020-09-16 PROCEDURE — 80307 DRUG TEST PRSMV CHEM ANLYZR: CPT

## 2020-09-16 RX ORDER — PROMETHAZINE HYDROCHLORIDE 25 MG/1
12.5 TABLET ORAL EVERY 6 HOURS PRN
Status: DISCONTINUED | OUTPATIENT
Start: 2020-09-16 | End: 2020-09-17 | Stop reason: HOSPADM

## 2020-09-16 RX ORDER — MAGNESIUM SULFATE 1 G/100ML
1 INJECTION INTRAVENOUS PRN
Status: DISCONTINUED | OUTPATIENT
Start: 2020-09-16 | End: 2020-09-17 | Stop reason: HOSPADM

## 2020-09-16 RX ORDER — SPIRONOLACTONE 50 MG/1
50 TABLET, FILM COATED ORAL DAILY
Status: ON HOLD | COMMUNITY
End: 2020-09-18 | Stop reason: SDUPTHER

## 2020-09-16 RX ORDER — MAGNESIUM CHLORIDE 64 MG
1 TABLET, DELAYED RELEASE (ENTERIC COATED) ORAL
COMMUNITY

## 2020-09-16 RX ORDER — TRAZODONE HYDROCHLORIDE 50 MG/1
50 TABLET ORAL NIGHTLY PRN
Status: DISCONTINUED | OUTPATIENT
Start: 2020-09-16 | End: 2020-09-17 | Stop reason: HOSPADM

## 2020-09-16 RX ORDER — SODIUM CHLORIDE 9 MG/ML
INJECTION, SOLUTION INTRAVENOUS CONTINUOUS
Status: DISCONTINUED | OUTPATIENT
Start: 2020-09-16 | End: 2020-09-17 | Stop reason: HOSPADM

## 2020-09-16 RX ORDER — SPIRONOLACTONE 25 MG/1
50 TABLET ORAL DAILY
Status: CANCELLED | OUTPATIENT
Start: 2020-09-16

## 2020-09-16 RX ORDER — THIAMINE MONONITRATE (VIT B1) 100 MG
100 TABLET ORAL DAILY
Status: DISCONTINUED | OUTPATIENT
Start: 2020-09-17 | End: 2020-09-17 | Stop reason: HOSPADM

## 2020-09-16 RX ORDER — LACOSAMIDE 100 MG/1
100 TABLET ORAL 2 TIMES DAILY
Status: DISCONTINUED | OUTPATIENT
Start: 2020-09-16 | End: 2020-09-17 | Stop reason: HOSPADM

## 2020-09-16 RX ORDER — FOLIC ACID 1 MG/1
1 TABLET ORAL DAILY
Status: DISCONTINUED | OUTPATIENT
Start: 2020-09-17 | End: 2020-09-17 | Stop reason: HOSPADM

## 2020-09-16 RX ORDER — M-VIT,TX,IRON,MINS/CALC/FOLIC 27MG-0.4MG
1 TABLET ORAL DAILY
COMMUNITY

## 2020-09-16 RX ORDER — 0.9 % SODIUM CHLORIDE 0.9 %
2000 INTRAVENOUS SOLUTION INTRAVENOUS ONCE
Status: COMPLETED | OUTPATIENT
Start: 2020-09-16 | End: 2020-09-16

## 2020-09-16 RX ORDER — POTASSIUM CHLORIDE 7.45 MG/ML
10 INJECTION INTRAVENOUS PRN
Status: DISCONTINUED | OUTPATIENT
Start: 2020-09-16 | End: 2020-09-17 | Stop reason: HOSPADM

## 2020-09-16 RX ORDER — SODIUM CHLORIDE 0.9 % (FLUSH) 0.9 %
10 SYRINGE (ML) INJECTION EVERY 12 HOURS SCHEDULED
Status: DISCONTINUED | OUTPATIENT
Start: 2020-09-16 | End: 2020-09-17 | Stop reason: HOSPADM

## 2020-09-16 RX ORDER — ACETAMINOPHEN 500 MG
TABLET ORAL
Status: COMPLETED
Start: 2020-09-16 | End: 2020-09-16

## 2020-09-16 RX ORDER — ACETAMINOPHEN 500 MG
1000 TABLET ORAL ONCE
Status: COMPLETED | OUTPATIENT
Start: 2020-09-16 | End: 2020-09-16

## 2020-09-16 RX ORDER — PIPERACILLIN SODIUM, TAZOBACTAM SODIUM 3; .375 G/15ML; G/15ML
3.38 INJECTION, POWDER, LYOPHILIZED, FOR SOLUTION INTRAVENOUS ONCE
Status: DISCONTINUED | OUTPATIENT
Start: 2020-09-16 | End: 2020-09-16 | Stop reason: SDUPTHER

## 2020-09-16 RX ORDER — POLYETHYLENE GLYCOL 3350 17 G/17G
17 POWDER, FOR SOLUTION ORAL DAILY PRN
Status: DISCONTINUED | OUTPATIENT
Start: 2020-09-16 | End: 2020-09-17 | Stop reason: HOSPADM

## 2020-09-16 RX ORDER — ONDANSETRON 2 MG/ML
4 INJECTION INTRAMUSCULAR; INTRAVENOUS EVERY 6 HOURS PRN
Status: DISCONTINUED | OUTPATIENT
Start: 2020-09-16 | End: 2020-09-17 | Stop reason: HOSPADM

## 2020-09-16 RX ORDER — PIPERACILLIN SODIUM, TAZOBACTAM SODIUM 3; .375 G/15ML; G/15ML
3.38 INJECTION, POWDER, LYOPHILIZED, FOR SOLUTION INTRAVENOUS EVERY 6 HOURS
Status: DISCONTINUED | OUTPATIENT
Start: 2020-09-17 | End: 2020-09-16 | Stop reason: SDUPTHER

## 2020-09-16 RX ORDER — LORAZEPAM 2 MG/ML
1 INJECTION INTRAMUSCULAR EVERY 6 HOURS PRN
Status: DISCONTINUED | OUTPATIENT
Start: 2020-09-16 | End: 2020-09-17 | Stop reason: HOSPADM

## 2020-09-16 RX ORDER — IPRATROPIUM BROMIDE AND ALBUTEROL SULFATE 2.5; .5 MG/3ML; MG/3ML
1 SOLUTION RESPIRATORY (INHALATION) EVERY 4 HOURS PRN
Status: DISCONTINUED | OUTPATIENT
Start: 2020-09-16 | End: 2020-09-17 | Stop reason: HOSPADM

## 2020-09-16 RX ORDER — PANTOPRAZOLE SODIUM 40 MG/1
40 TABLET, DELAYED RELEASE ORAL
Status: DISCONTINUED | OUTPATIENT
Start: 2020-09-17 | End: 2020-09-17 | Stop reason: HOSPADM

## 2020-09-16 RX ORDER — POTASSIUM CHLORIDE 20 MEQ/1
40 TABLET, EXTENDED RELEASE ORAL PRN
Status: DISCONTINUED | OUTPATIENT
Start: 2020-09-16 | End: 2020-09-17 | Stop reason: HOSPADM

## 2020-09-16 RX ORDER — SODIUM CHLORIDE 0.9 % (FLUSH) 0.9 %
10 SYRINGE (ML) INJECTION PRN
Status: DISCONTINUED | OUTPATIENT
Start: 2020-09-16 | End: 2020-09-17 | Stop reason: HOSPADM

## 2020-09-16 RX ORDER — RISPERIDONE 1 MG/1
1 TABLET, FILM COATED ORAL NIGHTLY
Status: DISCONTINUED | OUTPATIENT
Start: 2020-09-16 | End: 2020-09-17 | Stop reason: HOSPADM

## 2020-09-16 RX ORDER — VANCOMYCIN HYDROCHLORIDE 250 MG/1
250 CAPSULE ORAL 4 TIMES DAILY
Status: DISCONTINUED | OUTPATIENT
Start: 2020-09-16 | End: 2020-09-16 | Stop reason: SDUPTHER

## 2020-09-16 RX ORDER — CHLORDIAZEPOXIDE HYDROCHLORIDE 5 MG/1
5 CAPSULE, GELATIN COATED ORAL EVERY 6 HOURS PRN
Status: DISCONTINUED | OUTPATIENT
Start: 2020-09-16 | End: 2020-09-17 | Stop reason: HOSPADM

## 2020-09-16 RX ADMIN — SODIUM CHLORIDE 2000 ML: 9 INJECTION, SOLUTION INTRAVENOUS at 12:49

## 2020-09-16 RX ADMIN — SODIUM CHLORIDE: 9 INJECTION, SOLUTION INTRAVENOUS at 19:43

## 2020-09-16 RX ADMIN — PIPERACILLIN AND TAZOBACTAM 3.38 G: 3; .375 INJECTION, POWDER, LYOPHILIZED, FOR SOLUTION INTRAVENOUS at 19:31

## 2020-09-16 RX ADMIN — ACETAMINOPHEN 1000 MG: 500 TABLET ORAL at 20:09

## 2020-09-16 RX ADMIN — SODIUM CHLORIDE: 9 INJECTION, SOLUTION INTRAVENOUS at 10:45

## 2020-09-16 RX ADMIN — SODIUM CHLORIDE: 9 INJECTION, SOLUTION INTRAVENOUS at 22:35

## 2020-09-16 RX ADMIN — VANCOMYCIN HYDROCHLORIDE 250 MG: 10 INJECTION, POWDER, LYOPHILIZED, FOR SOLUTION INTRAVENOUS at 10:50

## 2020-09-16 RX ADMIN — ACETAMINOPHEN 1000 MG: 500 TABLET ORAL at 19:43

## 2020-09-16 ASSESSMENT — PAIN DESCRIPTION - DESCRIPTORS: DESCRIPTORS: BURNING;DISCOMFORT

## 2020-09-16 ASSESSMENT — PAIN DESCRIPTION - ORIENTATION: ORIENTATION: RIGHT

## 2020-09-16 ASSESSMENT — PAIN DESCRIPTION - LOCATION: LOCATION: ARM

## 2020-09-16 ASSESSMENT — PAIN SCALES - GENERAL
PAINLEVEL_OUTOF10: 0
PAINLEVEL_OUTOF10: 0
PAINLEVEL_OUTOF10: 8

## 2020-09-16 ASSESSMENT — PAIN DESCRIPTION - FREQUENCY: FREQUENCY: CONTINUOUS

## 2020-09-16 ASSESSMENT — PAIN DESCRIPTION - PAIN TYPE
TYPE: ACUTE PAIN
TYPE: ACUTE PAIN

## 2020-09-16 ASSESSMENT — PAIN DESCRIPTION - ONSET: ONSET: ON-GOING

## 2020-09-16 NOTE — ED PROVIDER NOTES
Department of Emergency Medicine   ED  Provider Note  Admit Date/RoomTime: 9/16/2020 10:12 AM  ED Room: 13/13          History of Present Illness:  9/16/20, Time: 12:19 PM EDT  Chief Complaint   Patient presents with    Fatigue     coming from recovery center   Erin Durham is a 64 y.o. female presenting to the ED for fatigue. Patient is coming from a detox center. She was an alcoholic. Last drink was over 3 months ago. She has had no alcohol since. She has been feeling fatigued for the past couple of days. Nothing makes it better or worse, she had several episodes of loose watery stools. She also complains of subjective fevers and chills. She took ibuprofen about an hour prior to arrival.  She denies any hematemesis or hematochezia. She was recently evaluated in the ER after she had a breakthrough seizure. Does have a history of seizures. Denies any abdominal, nausea, vomiting, urinary symptoms, chest pain, cough, sputum, neck pain or stiffness, or any other symptoms or complaints. Review of Systems:   Pertinent positives and negatives are stated within HPI, all other systems reviewed and are negative.        --------------------------------------------- PAST HISTORY ---------------------------------------------  Past Medical History:  has a past medical history of Alcohol abuse, in remission, Alcoholic cirrhosis of liver with ascites ([facility] and Confusion. Past Surgical History:  has no past surgical history on file. Social History:  reports that she has never smoked. She has never used smokeless tobacco. She reports previous alcohol use. She reports previous drug use. Family History: family history is not on file. . Unless otherwise noted, family history is non contributory    The patients home medications have been reviewed.     Allergies: Sulfa antibiotics        ---------------------------------------------------PHYSICAL EXAM--------------------------------------    Constitutional/General: Alert and oriented x3  Head: Normocephalic and atraumatic  Eyes: PERRL, EOMI, sclera non icteric  Mouth: Oropharynx clear, handling secretions, no trismus, dry mucosal membranes   Neck: Supple, full ROM, no stridor, no meningeal signs  Respiratory: Lungs clear to auscultation bilaterally, no wheezes, rales, or rhonchi. Not in respiratory distress  Cardiovascular:  Regular rate. Regular rhythm. 2+ distal pulses. Equal extremity pulses. Chest: No chest wall tenderness  GI:  Abdomen Soft, Non tender, Non distended. No rebound, guarding, or rigidity. No pulsatile masses. Musculoskeletal: Moves all extremities x 4. Warm and well perfused, no clubbing, cyanosis, or edema. Capillary refill <3 seconds  Integument: skin warm and dry. No rashes. Neurologic: GCS 15, no focal deficits, symmetric strength 5/5 in the upper and lower extremities bilaterally  Psychiatric: Normal Affect          -------------------------------------------------- RESULTS -------------------------------------------------  I have personally reviewed all laboratory and imaging results for this patient. Results are listed below.      LABS: (Lab results interpreted by me)  Results for orders placed or performed during the hospital encounter of 09/16/20   CBC Auto Differential   Result Value Ref Range    WBC 4.8 4.5 - 11.5 E9/L    RBC 3.03 (L) 3.50 - 5.50 E12/L    Hemoglobin 8.2 (L) 11.5 - 15.5 g/dL    Hematocrit 26.9 (L) 34.0 - 48.0 %    MCV 88.8 80.0 - 99.9 fL    MCH 27.1 26.0 - 35.0 pg    MCHC 30.5 (L) 32.0 - 34.5 %    RDW 15.5 (H) 11.5 - 15.0 fL    Platelets 47 (L) 892 - 450 E9/L    MPV 11.2 7.0 - 12.0 fL    Neutrophils % 77.5 43.0 - 80.0 %    Immature Granulocytes % 0.4 0.0 - 5.0 %    Lymphocytes % 12.4 (L) 20.0 - 42.0 %    Monocytes % 9.1 2.0 - 12.0 %    Eosinophils % 0.2 0.0 - 6.0 %    Basophils % 0.4 0.0 - 2.0 %    Neutrophils Absolute 3.75 1.80 - 7.30 E9/L    Immature Granulocytes # 0.02 E9/L    Lymphocytes Absolute 0.60 (L) 1.50 - 4.00 E9/L    Monocytes Absolute 0.44 0.10 - 0.95 E9/L    Eosinophils Absolute 0.01 (L) 0.05 - 0.50 E9/L    Basophils Absolute 0.02 0.00 - 0.20 E9/L   Comprehensive Metabolic Panel   Result Value Ref Range    Sodium 133 132 - 146 mmol/L    Potassium 3.4 (L) 3.5 - 5.0 mmol/L    Chloride 99 98 - 107 mmol/L    CO2 23 22 - 29 mmol/L    Anion Gap 11 7 - 16 mmol/L    Glucose 147 (H) 74 - 99 mg/dL    BUN 9 6 - 20 mg/dL    CREATININE 0.7 0.5 - 1.0 mg/dL    GFR Non-African American >60 >=60 mL/min/1.73    GFR African American >60     Calcium 8.4 (L) 8.6 - 10.2 mg/dL    Total Protein 6.0 (L) 6.4 - 8.3 g/dL    Alb 2.8 (L) 3.5 - 5.2 g/dL    Total Bilirubin 0.6 0.0 - 1.2 mg/dL    Alkaline Phosphatase 107 (H) 35 - 104 U/L    ALT 10 0 - 32 U/L    AST 24 0 - 31 U/L   Troponin   Result Value Ref Range    Troponin <0.01 0.00 - 0.03 ng/mL   Brain Natriuretic Peptide   Result Value Ref Range    Pro- (H) 0 - 125 pg/mL   Lactic Acid, Plasma   Result Value Ref Range    Lactic Acid 3.7 (H) 0.5 - 2.2 mmol/L   Serum Drug Screen   Result Value Ref Range    Ethanol Lvl <10 mg/dL    Acetaminophen Level <5.0 (L) 10.0 - 21.9 mcg/mL    Salicylate, Serum <5.0 0.0 - 30.0 mg/dL    TCA Scrn NEGATIVE Cutoff:300 ng/mL   Platelet Confirmation   Result Value Ref Range    Platelet Confirmation CONFIRMED    EKG 12 Lead   Result Value Ref Range    Ventricular Rate 84 BPM    Atrial Rate 84 BPM    P-R Interval 158 ms    QRS Duration 68 ms    Q-T Interval 368 ms    QTc Calculation (Bazett) 434 ms    P Axis 70 degrees    R Axis 79 degrees    T Axis 62 degrees   ,       RADIOLOGY:  Interpreted by Radiologist unless otherwise specified  US GALLBLADDER RUQ   Final Result      1. Cholelithiasis with mild cholecystitis. 2. Patient denies tenderness at the gallbladder. 3. Mild ascites. 4. History of cirrhosis.  Ultrasound today documents a dilated   recanalized umbilical vein consistent with varices. CT ABDOMEN PELVIS WO CONTRAST Additional Contrast? None   Final Result      1. Cholelithiasis and gallbladder wall edema. Consider sonographic   correlation. 2. Hepatic cirrhosis with splenomegaly and periumbilical collateral   veins giving rise to a prominent caput [place] indicating underlying   portal hypertension. XR CHEST PORTABLE   Final Result   Suboptimal inspiration with some likely secondary coarsening of   pulmonary markings related to crowding. EKG Interpretation  Interpreted by emergency department physician, Dr. Rossi Phipps, rate 84, no STEMI      ------------------------- NURSING NOTES AND VITALS REVIEWED ---------------------------   The nursing notes within the ED encounter and vital signs as below have been reviewed by myself  /61   Pulse 92   Temp 100.9 °F (38.3 °C) (Oral)   Resp 20   Ht 5' 5\" (1.651 m)   Wt 154 lb (69.9 kg)   LMP  (LMP Unknown)   SpO2 93%   BMI 25.63 kg/m²     Oxygen Saturation Interpretation: Normal    The patients available past medical records and past encounters were reviewed. ------------------------------ ED COURSE/MEDICAL DECISION MAKING----------------------  Medications   0.9 % sodium chloride infusion ( Intravenous New Bag 9/16/20 1943)   piperacillin-tazobactam (ZOSYN) 3.375 g in dextrose 5 % 100 mL IVPB (mini-bag) (3.375 g Intravenous New Bag 9/16/20 1931)   vancomycin (VANCOCIN) oral solution 250 mg (250 mg Oral Given 9/16/20 1050)   0.9 % sodium chloride bolus (0 mLs Intravenous Stopped 9/16/20 1449)   acetaminophen (TYLENOL) tablet 1,000 mg (1,000 mg Oral Given 9/16/20 1943)           The cardiac monitor revealed sinus with a heart rate in the 80s as interpreted by me. The cardiac monitor was ordered secondary to the patient's fatigue and to monitor the patient for dysrhythmia. CPT 66333         Medical Decision Making:    Labs and imaging reviewed.   Reevaluation, patient's resting comfortably. Repeat abdominal examination shows no abdominal pain, he her exam and history not consistent with acute cholecystitis. Chart review shows the patient did have a positive C diff during her eval the other day. However, given her C. difficile and dehydration, patient will be admitted. Counseling: The emergency provider has spoken with the patient and discussed todays results, in addition to providing specific details for the plan of care and counseling regarding the diagnosis and prognosis. Questions are answered at this time and they are agreeable with the plan.       --------------------------------- IMPRESSION AND DISPOSITION ---------------------------------    IMPRESSION  1. C. difficile diarrhea        DISPOSITION  Disposition: Admit to med/surg floor  Patient condition is stable        NOTE: This report was transcribed using voice recognition software.  Every effort was made to ensure accuracy; however, inadvertent computerized transcription errors may be present       Tresa Romero MD  09/16/20 1945

## 2020-09-16 NOTE — H&P
Hospitalist History & Physical      PCP: Lynne Gallegos MD    Date of Admission: 9/16/2020    Date of Service: Pt seen/examined on 9/16/2020 and is admitted as inpatient    Chief Complaint:  had concerns including Fatigue (coming from recovery center) and Chills. History Of Present Illness:    Ms. Ara Acuna, a 64y.o. year old female  who  has a past medical history of Alcohol abuse, in remission, Alcoholic cirrhosis of liver with ascites ([address] and Confusion. Briefly this is a past briefly this is a 82-year-old female with extensive past medical history including liver disease, ascites secondary to alcohol abuse who presented to the emergency department for second time today for concerns of fatigue and possible seizure today at [facility] alcohol rehab. She had presented to the emergency department yesterday with complaints of seizure. Patient was apparently discharged from the hospital.  She represents today with complaints of fatigue, several loose watery stools, subjective fevers and chills. She denies hematemesis or hematochezia. She tested positive for C. difficile yesterday. She does have a history of seizures and currently takes Vimpat but she reports she has been out of it for many weeks. She reports her last drink was 3 months ago and she is finished detox and remains in remission      ER work-up revealed improvement in her leukocytosis from yesterday. WBC yesterday was 20 and today at 4.8? Patient has a normocytic anemia which is currently stable. She has a history of ascites and possible varices for which she was supposed to get an EGD. Her lactic acid is 3.7 today. She is hypoalbuminemic likely secondary to liver disease she is also thrombocytopenic. She had an ultrasound which shows cholelithiasis with mild cholecystitis. Lajoyce Baba sign was negative. Mild ascites was present.   CT abdomen pelvis showed hepatic cirrhosis in addition to caput medusa signifying presence of underlying portal hypertension      Past Medical History:        Diagnosis Date    Alcohol abuse, in remission     Alcoholic cirrhosis of liver with ascites [address] [address]     Confusion 8/9/2020       Past Surgical History:    History reviewed. No pertinent surgical history. Medications Prior to Admission:      Prior to Admission medications    Medication Sig Start Date End Date Taking? Authorizing Provider   spironolactone (ALDACTONE) 50 MG tablet Take 50 mg by mouth daily   Yes Historical Provider, MD   magnesium chloride (MAG64) 64 MG TBEC extended release tablet Take 1 tablet by mouth daily (with breakfast)   Yes Historical Provider, MD   Multiple Vitamins-Minerals (THERAPEUTIC MULTIVITAMIN-MINERALS) tablet Take 1 tablet by mouth daily   Yes Historical Provider, MD   traZODone (DESYREL) 50 MG tablet Take 1 tablet by mouth nightly as needed for Sleep 8/25/20  Yes Daniella Hurd DO   risperiDONE (RISPERDAL) 0.5 MG tablet Take 1 tablet by mouth daily 8/26/20  Yes Daniella Hurd DO   risperiDONE (RISPERDAL) 1 MG tablet Take 1 tablet by mouth nightly 8/25/20  Yes Daniella Hurd DO   pantoprazole (PROTONIX) 40 MG tablet Take 1 tablet by mouth every morning (before breakfast) 7/31/20  Yes Jared Tabares MD   folic acid (FOLVITE) 1 MG tablet Take 1 tablet by mouth daily 5/27/20  Yes Macario Coleman DO   vitamin B-1 100 MG tablet Take 1 tablet by mouth daily 5/27/20  Yes Macario Coleman DO       Allergies:  Sulfa antibiotics    Social History:    TOBACCO:   reports that she has never smoked. She has never used smokeless tobacco.  ETOH:   reports previous alcohol use. Family History:    Reviewed in detail and negative for DM, CAD, Cancer, CVA. Positive as follows\"  History reviewed. No pertinent family history. REVIEW OF SYSTEMS:   Pertinent positives as noted in the HPI. All other systems reviewed and negative.     PHYSICAL EXAM:  BP (!) 120/55   Pulse 88   Temp 98.5 °F (36.9 °C) (Temporal)   Resp 20   Ht 5' 5\" (1.651 m)   Wt 154 lb (69.9 kg)   LMP  (LMP Unknown)   SpO2 94%   BMI 25.63 kg/m²   General appearance: No apparent distress, appears stated age and cooperative. HEENT: Normal cephalic, atraumatic without obvious deformity. Pupils equal, round, and reactive to light. Extra ocular muscles intact. Conjunctivae/corneas clear. Neck: Supple, with full range of motion. No jugular venous distention. Trachea midline. Respiratory: Clear to auscultation bilaterally without wheezes rales or rhonchi  Cardiovascular: Regular rate rhythm with normal S1-S2  Abdomen: Soft, nontender with mild upper quadrant fullness. No obvious ascites appreciated  Musculoskeletal: No clubbing, cyanosis, no edema of lower extremities were noted. Brisk capillary refill. Skin: Normal skin color. No rashes or lesions. Neurologic:  Neurovascularly intact without any focal sensory/motor deficits. Cranial nerves: II-XII intact, grossly non-focal.  Psychiatric: Alert and oriented, thought content appropriate, normal insight    Reviewed EKG and CXR personally    CBC:   Recent Labs     09/14/20 2036 09/16/20  1042   WBC 19.7* 4.8   RBC 3.29* 3.03*   HGB 8.9* 8.2*   HCT 28.9* 26.9*   MCV 87.8 88.8   RDW 15.5* 15.5*   PLT 49* 47*     BMP:   Recent Labs     09/14/20 2036 09/16/20  1042    133   K 3.9 3.4*    99   CO2 19* 23   BUN 9 9   CREATININE 0.7 0.7     LFT:  Recent Labs     09/14/20 2036 09/16/20  1042   PROT 6.4 6.0*   ALKPHOS 114* 107*   ALT 12 10   AST 27 24   BILITOT 0.8 0.6   LIPASE 39  --      CE:  Recent Labs     09/16/20  1042   TROPONINI <0.01     PT/INR: No results for input(s): INR, APTT in the last 72 hours. BNP: No results for input(s): BNP in the last 72 hours.   ESR:   Lab Results   Component Value Date    SEDRATE 4 07/21/2020     CRP:   Lab Results   Component Value Date    CRP 26.8 (H) 07/21/2020     D Dimer: No results found for: DDIMER   Folate and B12:   Lab Results   Component Value Date    [id number] >1000 (H) OTHER: There are periumbilical collateral veins which form a prominent caput Medusa indicating underlying portal hypertension. 1. Cholelithiasis and gallbladder wall edema. Consider sonographic correlation. 2. Hepatic cirrhosis with splenomegaly and periumbilical collateral veins giving rise to a prominent caput Medusa indicating underlying portal hypertension. Ct Head Wo Contrast    Result Date: 2020  Patient MRN:  [id number] : 1960 Age: 64 years Gender: Female Order Date:  2020 11:05 PM TECHNIQUE/NUMBER OF IMAGES/COMPARISON/CLINICAL HISTORY: CT brain Axial images were obtained sagittal and coronal reconstructions. 120 images History trauma injury, 73-year-old female patient. FINDINGS: There are unremarkable appearance for peripheral CSF space and ventricular system. There is no focal mass effect or midline shift. There is an area of hypodensity in the central aspect of the dinorah in both sides of the midline more likely an old insult of lacunar appears relate with the small vessel or microangiopathy disease or. The No sizable area for new acute or recent insult in progression to the brain parenchyma. No indication for an acute intracranial hemorrhagic event. Images with bone window settings demonstrate no significant findings. No indication for an acute intracranial process. Us Gallbladder Ruq    Result Date: 2020  Patient MRN:  [id number] : 1960 Age: 64 years Gender: Female Order Date:  2020 3:14 PM EXAM: US GALLBLADDER RUQ INDICATION:  edema . History of decreased liver function, cirrhosis, ascites. COMPARISON: CT abdomen 2020. FINDINGS:  The gallbladder is mildly dilated up to 3.4 cm diameter. The gallbladder wall is mildly thickened with one focal area of to 7 mm. There are dependent mobile stones in the gallbladder fundus with mild shadowing. The largest stone measures in the 2 cm size range. There are no stones seen in the gallbladder neck.  The patient states no tenderness of the gallbladder when pressing on indirectly. There is a mild amount of ascites noted and small amount of free fluid adjacent to the liver margin and adjacent to the gallbladder. The common bile duct measures 6 mm. Note is made of a dilated recanalized umbilical vein at the falciform ligament measuring 8 to 10 mm diameter and showing prominent blood flow. Most of the liver is not included. Right kidney and pancreas are not included. 1. Cholelithiasis with mild cholecystitis. 2. Patient denies tenderness at the gallbladder. 3. Mild ascites. 4. History of cirrhosis. Ultrasound today documents a dilated recanalized umbilical vein consistent with varices. Xr Chest Portable    Result Date: 2020  Patient MRN: [id number] : 1960 Age:  64 years Gender: Female Order Date: 2020 10:36 AM Exam: XR CHEST PORTABLE Number of Images: 1 Indication:  Acute Shortness of breath Comparison: 2020 FINDINGS: Slight coarsening of pulmonary markings at the bases may in fact be a manifestation of suboptimal inspiration. Heart and pulmonary vascularity are normal. Neither costophrenic angle is visibly blunted. Suboptimal inspiration with some likely secondary coarsening of pulmonary markings related to crowding. Xr Chest Portable    Result Date: 2020  Patient MRN: [id number] : 1960 Age:  64 years Gender: Female Order Date: 2020 8:34 PM Exam: XR CHEST PORTABLE Number of Images: 1 view Indication:  Seizure Comparison: None. Findings: The lungs are symmetrically expanded, and are clear. There is no evidence of pneumothorax or pleural effusion. Cardiovascular shadows are normal in appearance. There is no evidence of cardiac enlargement or decompensation. Skeletal structures show no evidence of acute pathology. Overlying EKG leads are present. No acute cardiopulmonary pathology or definite evidence of acute chest wall trauma.       ASSESSMENT:    Sepsis  C. difficile infection  Dehydration  Fatigue  History of alcohol abuse  History of varices  History of seizures  Normocytic anemia  Acute cholecystitis  Thrombocytopenia  Lactic acidosis      PLAN:    Admit for observation  IV fluid hydration; ID consultation; IV Zosyn for intra-abdominal coverage and p.o. Dane  Has been off AED; restart Vimpat and monitor for seizures. Considered neuro consult. [facility]  General surgery consult for evaluation of mild cholecystitis  Monitor hemoglobin hematocrit  Home medications have been ordered as appropriate as possible. Follow daily CMP. N.p.o. for possible surgical intervention  Continue Vimpat 100 mg twice daily  Continue to hold Lasix and Aldactone  Retest C. difficile, isolation as ordered      Diet: No diet orders on file  Code Status: Prior    Surrogate decision maker confirmed with patient:  Primary Emergency Contact: Sharlene Gunderson, Home Phone: [phone]    DVT Prophylaxis: []Lovenox []Heparin []PCD [] [address] [x]Encouraged ambulation; contraindicated due to thrombocytopenia    Disposition: [x]Med/Surg [] Intermediate [] ICU/CCU  Admit status: [x] Observation [] Inpatient     +++++++++++++++++++++++++++++++++++++++++++++++++  Chente Dumont, [place]  +++++++++++++++++++++++++++++++++++++++++++++++++  NOTE: This report was transcribed using voice recognition software. Every effort was made to ensure accuracy; however, inadvertent computerized transcription errors may be present.

## 2020-09-17 PROBLEM — E87.1 HYPONATREMIA: Status: ACTIVE | Noted: 2020-09-17

## 2020-09-17 LAB
ALBUMIN SERPL-MCNC: 2.4 G/DL (ref 3.5–5.2)
ALP BLD-CCNC: 101 U/L (ref 35–104)
ALT SERPL-CCNC: 10 U/L (ref 0–32)
ANION GAP SERPL CALCULATED.3IONS-SCNC: 10 MMOL/L (ref 7–16)
AST SERPL-CCNC: 22 U/L (ref 0–31)
BILIRUB SERPL-MCNC: 0.8 MG/DL (ref 0–1.2)
BUN BLDV-MCNC: 6 MG/DL (ref 6–20)
C DIFF TOXIN/ANTIGEN: ABNORMAL
CALCIUM SERPL-MCNC: 8 MG/DL (ref 8.6–10.2)
CHLORIDE BLD-SCNC: 101 MMOL/L (ref 98–107)
CO2: 20 MMOL/L (ref 22–29)
CREAT SERPL-MCNC: 0.7 MG/DL (ref 0.5–1)
CULTURE, STOOL: NORMAL
GFR AFRICAN AMERICAN: >60
GFR NON-AFRICAN AMERICAN: >60 ML/MIN/1.73
GLUCOSE BLD-MCNC: 90 MG/DL (ref 74–99)
INR BLD: 1.4
MAGNESIUM: 1.2 MG/DL (ref 1.6–2.6)
POTASSIUM SERPL-SCNC: 3.5 MMOL/L (ref 3.5–5)
PROTHROMBIN TIME: 16.3 SEC (ref 9.3–12.4)
SODIUM BLD-SCNC: 131 MMOL/L (ref 132–146)
TOTAL PROTEIN: 5.7 G/DL (ref 6.4–8.3)

## 2020-09-17 PROCEDURE — 85610 PROTHROMBIN TIME: CPT

## 2020-09-17 PROCEDURE — 99254 IP/OBS CNSLTJ NEW/EST MOD 60: CPT | Performed by: SURGERY

## 2020-09-17 PROCEDURE — 6370000000 HC RX 637 (ALT 250 FOR IP): Performed by: FAMILY MEDICINE

## 2020-09-17 PROCEDURE — 87449 NOS EACH ORGANISM AG IA: CPT

## 2020-09-17 PROCEDURE — 36415 COLL VENOUS BLD VENIPUNCTURE: CPT

## 2020-09-17 PROCEDURE — 80053 COMPREHEN METABOLIC PANEL: CPT

## 2020-09-17 PROCEDURE — 87324 CLOSTRIDIUM AG IA: CPT

## 2020-09-17 PROCEDURE — 6360000002 HC RX W HCPCS: Performed by: FAMILY MEDICINE

## 2020-09-17 PROCEDURE — 83735 ASSAY OF MAGNESIUM: CPT

## 2020-09-17 PROCEDURE — 2580000003 HC RX 258: Performed by: FAMILY MEDICINE

## 2020-09-17 RX ADMIN — PIPERACILLIN SODIUM AND TAZOBACTAM SODIUM 3.38 G: 3; .375 INJECTION, POWDER, LYOPHILIZED, FOR SOLUTION INTRAVENOUS at 03:35

## 2020-09-17 RX ADMIN — LACOSAMIDE 100 MG: 100 TABLET, FILM COATED ORAL at 00:07

## 2020-09-17 RX ADMIN — PANTOPRAZOLE SODIUM 40 MG: 40 TABLET, DELAYED RELEASE ORAL at 05:17

## 2020-09-17 RX ADMIN — POTASSIUM CHLORIDE 40 MEQ: 1500 TABLET, EXTENDED RELEASE ORAL at 00:07

## 2020-09-17 RX ADMIN — RISPERIDONE 1 MG: 1 TABLET, FILM COATED ORAL at 00:07

## 2020-09-17 RX ADMIN — VANCOMYCIN HYDROCHLORIDE 250 MG: 10 INJECTION, POWDER, LYOPHILIZED, FOR SOLUTION INTRAVENOUS at 00:08

## 2020-09-17 NOTE — DISCHARGE SUMMARY
Hospitalist Discharge Summary    Patient ID: Alvaro Vargas   Patient : 1960  Patient's PCP: Monica Pollard MD    Admit Date: 2020   Admitting Physician: Alysia Reilly MD    Discharge Date:  2020  Discharge Physician: Alysia Reilly MD   Discharge Condition: UNKNOWN  Discharge Disposition: AMA    History of presenting illness:  Briefly this is a past briefly this is a 51-year-old female with extensive past medical history including liver disease, ascites secondary to alcohol abuse who presented to the emergency department for second time today for concerns of fatigue and possible seizure today at [facility] alcohol rehab. She had presented to the emergency department yesterday with complaints of seizure. Patient was apparently discharged from the hospital.  She represents today with complaints of fatigue, several loose watery stools, subjective fevers and chills. She denies hematemesis or hematochezia. She tested positive for C. difficile yesterday. She does have a history of seizures and currently takes Vimpat but she reports she has been out of it for many weeks. She reports her last drink was 3 months ago and she is finished detox and remains in remission       ER work-up revealed improvement in her leukocytosis from yesterday. WBC yesterday was 20 and today at 4.8? Patient has a normocytic anemia which is currently stable. She has a history of ascites and possible varices for which she was supposed to get an EGD. Her lactic acid is 3.7 today. She is hypoalbuminemic likely secondary to liver disease she is also thrombocytopenic.     She had an ultrasound which shows cholelithiasis with mild cholecystitis. Kim Pal sign was negative. Mild ascites was present.   CT abdomen pelvis showed hepatic cirrhosis in addition to caput medusa signifying presence of underlying portal hypertension    Hospital course in brief:  (Please refer to daily progress notes for a comprehensive review of the hospitalization by requesting medical records)    Admitted for observation and management. General surgery consult deemed patient stable from a cholecystitis standpoint. There is low suspicion for this as patient is asymptomatic. Patient is admitted for management of C. difficile as well but patient left AMA morning of 9/17/2020. Exact reason has not been documented. Patient reported she had check-in at an emergency department close to her house? Condition of patient at the time of discharge is unknown. Consults:   IP CONSULT TO HOSPITALIST  IP CONSULT TO GENERAL SURGERY  IP CONSULT TO INFECTIOUS DISEASES    Discharge Diagnoses:    Sepsis  C. difficile infection  Dehydration  Fatigue  History of alcohol abuse  History of varices  History of seizures  Normocytic anemia  Acute cholecystitis  Thrombocytopenia  Lactic acidosis    Discharge Instructions / Follow up:    Continued appropriate risk factor modification of blood pressure, diabetes and serum lipids will remain essential to reducing risk of future atherosclerotic development    Activity: activity as tolerated    Significant labs:  CBC:   Recent Labs     09/14/20 2036 09/16/20  1042   WBC 19.7* 4.8   RBC 3.29* 3.03*   HGB 8.9* 8.2*   HCT 28.9* 26.9*   MCV 87.8 88.8   RDW 15.5* 15.5*   PLT 49* 47*     BMP:   Recent Labs     09/14/20 2036 09/16/20  1042 09/17/20  0539    133 131*   K 3.9 3.4* 3.5    99 101   CO2 19* 23 20*   BUN 9 9 6   CREATININE 0.7 0.7 0.7   MG  --   --  1.2*     LFT:  Recent Labs     09/14/20 2036 09/16/20  1042 09/17/20  0539   PROT 6.4 6.0* 5.7*   ALKPHOS 114* 107* 101   ALT 12 10 10   AST 27 24 22   BILITOT 0.8 0.6 0.8   LIPASE 39  --   --      PT/INR:   Recent Labs     09/17/20  0539   INR 1.4     BNP: No results for input(s): BNP in the last 72 hours.   Hgb A1C:   Lab Results   Component Value Date    LABA1C 4.6 08/01/2020     Folate and B12:   Lab Results   Component Value Date    [id number] >1000 (H) 2020   ,   Lab Results   Component Value Date    FOLATE >20.0 2020     Thyroid Studies:   Lab Results   Component Value Date    TSH 2.175 2020       Urinalysis:    Lab Results   Component Value Date    NITRU Negative 2020    WBCUA 5-10 2020    WBCUA see below 2020    BACTERIA FEW 2020    RBCUA 1-3 2020    RBCUA Present 2020    BLOODU TRACE-INTACT 2020    SPECGRAV >=1.030 2020    GLUCOSEU Negative 2020       Imaging:  Ct Abdomen Pelvis Wo Contrast Additional Contrast? None    Result Date: 2020  Patient MRN:  [id number] : 1960 Age: 64 years Gender: Female Order Date:  2020 1:04 PM EXAM: CT ABDOMEN PELVIS WO CONTRAST NUMBER OF IMAGES \ views:  352 INDICATION:  ab pain With fatigue and chills. COMPARISON: None TECHNIQUE: Axial computerized tomography sections of the abdomen and pelvis with sagittal and coronal MPR reconstructions were obtained from the top of the diaphragm to the pelvis. Low-dose CT  acquisition technique included one of following options; 1 . Automated exposure control, 2. Adjustment of MA and or KV according to patient's size or 3. Use of iterative reconstruction. FINDINGS: THORACIC BASE: Modest bibasilar atelectasis. LIVER: Cirrhotic morphology. Probable benign cysts present in the right lobe. BILIARY: Solitary roughly 18 mm gallstone present. Gallbladder wall edema is suggested. PANCREAS: Unremarkable. SPLEEN: Enlarged at 15.7 cm. ADRENALS:  Unremarkable. KIDNEYS:  Unremarkable. GI: No evidence of free air or bowel obstruction. The appendix is normal. Small hilar hernia present. PELVIS: Unremarkable. MSK: No acute osseous findings. OTHER: There are periumbilical collateral veins which form a prominent caput Medusa indicating underlying portal hypertension. 1. Cholelithiasis and gallbladder wall edema. Consider sonographic correlation.  2. Hepatic cirrhosis with splenomegaly and periumbilical collateral veins giving rise to a prominent caput Medusa indicating underlying portal hypertension. Ct Head Wo Contrast    Result Date: 2020  Patient MRN:  [id number] : 1960 Age: 64 years Gender: Female Order Date:  2020 11:05 PM TECHNIQUE/NUMBER OF IMAGES/COMPARISON/CLINICAL HISTORY: CT brain Axial images were obtained sagittal and coronal reconstructions. 120 images History trauma injury, 61-year-old female patient. FINDINGS: There are unremarkable appearance for peripheral CSF space and ventricular system. There is no focal mass effect or midline shift. There is an area of hypodensity in the central aspect of the dinorah in both sides of the midline more likely an old insult of lacunar appears relate with the small vessel or microangiopathy disease or. The No sizable area for new acute or recent insult in progression to the brain parenchyma. No indication for an acute intracranial hemorrhagic event. Images with bone window settings demonstrate no significant findings. No indication for an acute intracranial process. Us Gallbladder Ruq    Result Date: 2020  Patient MRN:  [id number] : 1960 Age: 64 years Gender: Female Order Date:  2020 3:14 PM EXAM: US GALLBLADDER RUQ INDICATION:  edema . History of decreased liver function, cirrhosis, ascites. COMPARISON: CT abdomen 2020. FINDINGS:  The gallbladder is mildly dilated up to 3.4 cm diameter. The gallbladder wall is mildly thickened with one focal area of to 7 mm. There are dependent mobile stones in the gallbladder fundus with mild shadowing. The largest stone measures in the 2 cm size range. There are no stones seen in the gallbladder neck. The patient states no tenderness of the gallbladder when pressing on indirectly. There is a mild amount of ascites noted and small amount of free fluid adjacent to the liver margin and adjacent to the gallbladder. The common bile duct measures 6 mm.  Note is made of a dilated recanalized umbilical vein at the falciform ligament measuring 8 to 10 mm diameter and showing prominent blood flow. Most of the liver is not included. Right kidney and pancreas are not included. 1. Cholelithiasis with mild cholecystitis. 2. Patient denies tenderness at the gallbladder. 3. Mild ascites. 4. History of cirrhosis. Ultrasound today documents a dilated recanalized umbilical vein consistent with varices. Xr Chest Portable    Result Date: 2020  Patient MRN: [id number] : 1960 Age:  64 years Gender: Female Order Date: 2020 10:36 AM Exam: XR CHEST PORTABLE Number of Images: 1 Indication:  Acute Shortness of breath Comparison: 2020 FINDINGS: Slight coarsening of pulmonary markings at the bases may in fact be a manifestation of suboptimal inspiration. Heart and pulmonary vascularity are normal. Neither costophrenic angle is visibly blunted. Suboptimal inspiration with some likely secondary coarsening of pulmonary markings related to crowding. Xr Chest Portable    Result Date: 2020  Patient MRN: [id number] : 1960 Age:  64 years Gender: Female Order Date: 2020 8:34 PM Exam: XR CHEST PORTABLE Number of Images: 1 view Indication:  Seizure Comparison: None. Findings: The lungs are symmetrically expanded, and are clear. There is no evidence of pneumothorax or pleural effusion. Cardiovascular shadows are normal in appearance. There is no evidence of cardiac enlargement or decompensation. Skeletal structures show no evidence of acute pathology. Overlying EKG leads are present. No acute cardiopulmonary pathology or definite evidence of acute chest wall trauma. Discharge Medications:      Medication List      ASK your doctor about these medications    folic acid 1 MG tablet  Commonly known as:  FOLVITE  Take 1 tablet by mouth daily     Mag64 64 MG Tbec extended release tablet  Generic drug:  magnesium chloride  Ask about: Which instructions should I use?      pantoprazole 40 MG tablet  Commonly known as:  PROTONIX  Take 1 tablet by mouth every morning (before breakfast)     * risperiDONE 1 MG tablet  Commonly known as:  RISPERDAL  Take 1 tablet by mouth nightly     * risperiDONE 0.5 MG tablet  Commonly known as:  RISPERDAL  Take 1 tablet by mouth daily     spironolactone 50 MG tablet  Commonly known as:  ALDACTONE  Ask about: Which instructions should I use? therapeutic multivitamin-minerals tablet     thiamine 100 MG tablet  Take 1 tablet by mouth daily     traZODone 50 MG tablet  Commonly known as:  DESYREL  Take 1 tablet by mouth nightly as needed for Sleep         * This list has 2 medication(s) that are the same as other medications prescribed for you. Read the directions carefully, and ask your doctor or other care provider to review them with you. Time Spent on discharge is more than 35 minutes in the examination, evaluation, counseling and review of medications and discharge plan.    +++++++++++++++++++++++++++++++++++++++++++++++++  [address] [place], [place]  +++++++++++++++++++++++++++++++++++++++++++++++++  NOTE: This report was transcribed using voice recognition software. Every effort was made to ensure accuracy; however, inadvertent computerized transcription errors may be present.

## 2020-09-17 NOTE — PROGRESS NOTES
General Surgery Progress Note:  Patient seen and examined, agree with resident note, for remaining HP/Consult details please see resident HP/Consult note. CC: abnormal GB on imaging     S: doing ok, denies abd pain, has had crampy abd pain over last 2 weeks, mostly lower pelvic that is relieved with bowel movements. Objective:  @BP (!) 116/54   Pulse 77   Temp 99.5 °F (37.5 °C) (Oral)   Resp 19   Ht 5' 5\" (1.651 m)   Wt 154 lb (69.9 kg)   LMP  (LMP Unknown)   SpO2 96%   BMI 25.63 kg/m² @    Physical -     Gen: NAD  Resp: Breathing Comfortably, no resp distress  CV: Reg Rate  Abd: SNT no obvious masses   EXT NVI     Assessment/Plan: abnormal CT, pre existing liver disease with hepatic cirrhosis     - based on clinical assessment she needs nothing done. - based on the scans there is some edema of gb and stones but this may be related to liver disease and is essentially asymptomatic.    - She is apparently leaving to go to another hospital... in any case I don't believe she has any surgical indications. .. she also happens to be very high risk.        Call as needed       Laura Garcia MD FACS     10:14 AM

## 2020-09-17 NOTE — PROGRESS NOTES
Dr. Leslie Poon notified of patient and patient POA daughter Shameka You wanting patient to be transferred to [facility].

## 2020-09-17 NOTE — CARE COORDINATION
Discharge order noted. Patient with extensive past medical history including liver disease, ascites secondary to alcohol abuse is admitted to the hospital with C-Diff, Acute cholecystitis, dehydration and fatigue. Per H&P note, she is from [facility] alcohol rehab. I met with patient in room to explain role and discuss transition of care. patient said she does not need me, she signed AMA papers and her daughter will be here in about 45 minutes to transport her home.   Malcom Garcia RN CM

## 2020-09-17 NOTE — CONSULTS
GENERAL SURGERY  CONSULT NOTE  9/17/2020    HPI  Jackie Godoy is a 64 y.o. female for whom general surgery was consulted for evaluation for cholecystitis. The patient has a notable history of alcoholic cirrhosis - EtOH abuse in remission per patient for the last 3 months. Brought into the hospital with concerns for fatigue,chills and recently a seizure. She was at an alcohol rehab facility where she had a seizure. She has a history of seizures for which she takes vimpat at home; however, she has not taken her medication recently due to inability to obtain it. She was recently diagnosed with C difficile for which she was started on antibiotics. As part of the workup for this a CT scan of the abdomen and pelvis was obtained which showed gallbladder wall thickening and cholelithiasis. Her liver appeared cirrhotic and she had ascites. She has had no symptoms of biliary colic. She has no pain with palpation of her RUQ. Past Medical History:   Diagnosis Date    Alcohol abuse, in remission     Alcoholic cirrhosis of liver with ascites ([facility] [address]     Confusion 8/9/2020       History reviewed. No pertinent surgical history. Medications Prior to Admission:    Prior to Admission medications    Medication Sig Start Date End Date Taking?  Authorizing Provider   spironolactone (ALDACTONE) 50 MG tablet Take 50 mg by mouth daily   Yes Historical Provider, MD   magnesium chloride (MAG64) 64 MG TBEC extended release tablet Take 1 tablet by mouth daily (with breakfast)   Yes Historical Provider, MD   Multiple Vitamins-Minerals (THERAPEUTIC MULTIVITAMIN-MINERALS) tablet Take 1 tablet by mouth daily   Yes Historical Provider, MD   traZODone (DESYREL) 50 MG tablet Take 1 tablet by mouth nightly as needed for Sleep 8/25/20  Yes Bernadette Garcia DO   risperiDONE (RISPERDAL) 0.5 MG tablet Take 1 tablet by mouth daily 8/26/20  Yes Bernadette Garcia DO   risperiDONE (RISPERDAL) 1 MG tablet Take 1 tablet by mouth nightly 8/25/20  Yes Aguilar Parkinson, DO   pantoprazole (PROTONIX) 40 MG tablet Take 1 tablet by mouth every morning (before breakfast) 7/31/20  Yes Sage Maciel MD   folic acid (FOLVITE) 1 MG tablet Take 1 tablet by mouth daily 5/27/20  Yes Macario Coleman DO   vitamin B-1 100 MG tablet Take 1 tablet by mouth daily 5/27/20  Yes Macario Coleman DO       Allergies   Allergen Reactions    Sulfa Antibiotics Hives       History reviewed. No pertinent family history. Social History     Tobacco Use    Smoking status: Never Smoker    Smokeless tobacco: Never Used   Substance Use Topics    Alcohol use: Not Currently     Comment: None since hospitalization 5/23/20    Drug use: Not Currently         Review of Systems   General ROS: positive for  - chills and fatigue  Hematological and Lymphatic ROS: negative  Respiratory ROS: no cough, shortness of breath, or wheezing  Cardiovascular ROS: no chest pain or dyspnea on exertion  Gastrointestinal ROS: positive for - change in bowel habits, change in stools and diarrhea  Genito-Urinary ROS: no dysuria, trouble voiding, or hematuria  Musculoskeletal ROS: negative      PHYSICAL EXAM:    Vitals:    09/16/20 2130   BP: (!) 116/54   Pulse: 77   Resp: 19   Temp: 99.5 °F (37.5 °C)   SpO2: 96%       General Appearance:  awake, alert, oriented, in no acute distress  Skin:  No obvious spider angiomata or jaundice  Head/face:  NCAT  Eyes:  PERRL, anicteric sclera  Lungs:  Symmetric chest rise  Heart:  Heart regular rate and rhythm  Abdomen:  Mildly Distended, no appreciable fluid wave, NTTP throughout, caput medusa, negative lutz's sign  Extremities: Extremities warm to touch, pink, with no edema.       LABS:    CBC  Recent Labs     09/16/20  1042   WBC 4.8   HGB 8.2*   HCT 26.9*   PLT 47*     BMP  Recent Labs     09/16/20  1042      K 3.4*   CL 99   CO2 23   BUN 9   CREATININE 0.7   CALCIUM 8.4*     Liver Function  Recent Labs     09/14/20 2036 09/16/20  1042   LIPASE 39  --    BILITOT 0.8 0.6   AST 27 24   ALT 12 10   ALKPHOS 114* 107*   PROT 6.4 6.0*   LABALBU 3.0* 2.8*     No results for input(s): LACTATE in the last 72 hours. No results for input(s): INR, PTT in the last 72 hours. Invalid input(s): PT    RADIOLOGY    Ct Abdomen Pelvis Wo Contrast Additional Contrast? None    Result Date: 2020  Patient MRN:  [id number] : 1960 Age: 64 years Gender: Female Order Date:  2020 1:04 PM EXAM: CT ABDOMEN PELVIS WO CONTRAST NUMBER OF IMAGES \ views:  352 INDICATION:  ab pain With fatigue and chills. COMPARISON: None TECHNIQUE: Axial computerized tomography sections of the abdomen and pelvis with sagittal and coronal MPR reconstructions were obtained from the top of the diaphragm to the pelvis. Low-dose CT  acquisition technique included one of following options; 1 . Automated exposure control, 2. Adjustment of MA and or KV according to patient's size or 3. Use of iterative reconstruction. FINDINGS: THORACIC BASE: Modest bibasilar atelectasis. LIVER: Cirrhotic morphology. Probable benign cysts present in the right lobe. BILIARY: Solitary roughly 18 mm gallstone present. Gallbladder wall edema is suggested. PANCREAS: Unremarkable. SPLEEN: Enlarged at 15.7 cm. ADRENALS:  Unremarkable. KIDNEYS:  Unremarkable. GI: No evidence of free air or bowel obstruction. The appendix is normal. Small hilar hernia present. PELVIS: Unremarkable. MSK: No acute osseous findings. OTHER: There are periumbilical collateral veins which form a prominent caput Medusa indicating underlying portal hypertension. 1. Cholelithiasis and gallbladder wall edema. Consider sonographic correlation. 2. Hepatic cirrhosis with splenomegaly and periumbilical collateral veins giving rise to a prominent caput Medusa indicating underlying portal hypertension.      Us Gallbladder Ruq    Result Date: 2020  Patient MRN:  [id number] : 1960 Age: 64 years Gender: Female Order Date:  2020 3:14 PM EXAM: [id number] [employer] INDICATION:  edema . History of decreased liver function, cirrhosis, ascites. COMPARISON: CT abdomen 2020. FINDINGS:  The gallbladder is mildly dilated up to 3.4 cm diameter. The gallbladder wall is mildly thickened with one focal area of to 7 mm. There are dependent mobile stones in the gallbladder fundus with mild shadowing. The largest stone measures in the 2 cm size range. There are no stones seen in the gallbladder neck. The patient states no tenderness of the gallbladder when pressing on indirectly. There is a mild amount of ascites noted and small amount of free fluid adjacent to the liver margin and adjacent to the gallbladder. The common bile duct measures 6 mm. Note is made of a dilated recanalized umbilical vein at the falciform ligament measuring 8 to 10 mm diameter and showing prominent blood flow. Most of the liver is not included. Right kidney and pancreas are not included. 1. Cholelithiasis with mild cholecystitis. 2. Patient denies tenderness at the gallbladder. 3. Mild ascites. 4. History of cirrhosis. Ultrasound today documents a dilated recanalized umbilical vein consistent with varices. Xr Chest Portable    Result Date: 2020  Patient MRN: [id number] : 1960 Age:  64 years Gender: Female Order Date: 2020 10:36 AM Exam: XR CHEST PORTABLE Number of Images: 1 Indication:  Acute Shortness of breath Comparison: 2020 FINDINGS: Slight coarsening of pulmonary markings at the bases may in fact be a manifestation of suboptimal inspiration. Heart and pulmonary vascularity are normal. Neither costophrenic angle is visibly blunted. Suboptimal inspiration with some likely secondary coarsening of pulmonary markings related to crowding. ASSESSMENT:  64 y.o. female with alcoholic cirrhosis. In work up, found to have CT scan with gallbladder wall thickening and cholelithiasis.  The appearance of her gallbladder on CT and RUQ US is likely a result of her liver disease. Initially her WBC was elevated and is now normal after starting antibiotics for C difficile infection. Her bilirubin is normal, Alk phos is minimally elevated at 107, and AST/ALT are normal. She denies biliary colic symptoms.     PLAN:  - no surgical intervention recommended at this time  - see attending attestation for final plan    Electronically signed by Gelacio Mercado MD on 9/17/20 at 1:47 AM EDT

## 2020-09-18 LAB
FECAL NEUTRAL FAT: NORMAL
FECAL SPLIT FATS: NORMAL

## 2020-09-21 LAB
BLOOD CULTURE, ROUTINE: NORMAL
CULTURE, BLOOD 2: NORMAL

## 2020-09-28 PROBLEM — R29.90 STROKE-LIKE SYMPTOMS: Status: ACTIVE | Noted: 2020-09-28

## 2020-10-01 PROBLEM — R50.9 FEVER: Status: RESOLVED | Noted: 2020-07-09 | Resolved: 2020-10-01

## 2020-10-01 PROBLEM — E83.42 HYPOMAGNESEMIA: Status: RESOLVED | Noted: 2020-05-23 | Resolved: 2020-10-01

## 2020-10-02 PROBLEM — R77.8 ELEVATED TROPONIN: Status: ACTIVE | Noted: 2020-05-23

## 2020-10-06 PROBLEM — R56.9 SEIZURES (HCC): Status: RESOLVED | Noted: 2020-06-22 | Resolved: 2020-10-06

## 2020-10-06 PROBLEM — R29.90 STROKE-LIKE SYMPTOMS: Status: RESOLVED | Noted: 2020-09-28 | Resolved: 2020-10-06

## 2020-10-06 PROBLEM — R77.8 ELEVATED TROPONIN: Status: RESOLVED | Noted: 2020-05-23 | Resolved: 2020-10-06

## 2020-10-06 PROBLEM — E87.6 HYPOKALEMIA: Status: RESOLVED | Noted: 2020-05-23 | Resolved: 2020-10-06

## 2020-10-09 PROBLEM — G40.909 NONINTRACTABLE EPILEPSY WITHOUT STATUS EPILEPTICUS (HCC): Status: ACTIVE | Noted: 2020-10-09

## 2020-10-09 PROBLEM — I24.8 DEMAND ISCHEMIA (HCC): Status: ACTIVE | Noted: 2020-10-09

## 2020-10-21 PROBLEM — R20.0 LEFT SIDED NUMBNESS: Status: ACTIVE | Noted: 2020-10-21

## 2020-10-27 PROBLEM — G40.901 STATUS EPILEPTICUS (HCC): Status: ACTIVE | Noted: 2020-10-27
